# Patient Record
Sex: FEMALE | Race: OTHER | URBAN - METROPOLITAN AREA
[De-identification: names, ages, dates, MRNs, and addresses within clinical notes are randomized per-mention and may not be internally consistent; named-entity substitution may affect disease eponyms.]

---

## 2023-07-16 SDOH — HEALTH STABILITY: PHYSICAL HEALTH: ON AVERAGE, HOW MANY DAYS PER WEEK DO YOU ENGAGE IN MODERATE TO STRENUOUS EXERCISE (LIKE A BRISK WALK)?: 2 DAYS

## 2023-07-16 SDOH — HEALTH STABILITY: PHYSICAL HEALTH: ON AVERAGE, HOW MANY MINUTES DO YOU ENGAGE IN EXERCISE AT THIS LEVEL?: 20 MIN

## 2023-07-16 ASSESSMENT — SOCIAL DETERMINANTS OF HEALTH (SDOH)

## 2023-07-19 ENCOUNTER — OFFICE VISIT (OUTPATIENT)
Age: 23
End: 2023-07-19

## 2023-07-19 VITALS
WEIGHT: 103.2 LBS | OXYGEN SATURATION: 98 % | RESPIRATION RATE: 16 BRPM | HEART RATE: 72 BPM | HEIGHT: 62 IN | SYSTOLIC BLOOD PRESSURE: 117 MMHG | DIASTOLIC BLOOD PRESSURE: 78 MMHG | TEMPERATURE: 98.1 F | BODY MASS INDEX: 18.99 KG/M2

## 2023-07-19 DIAGNOSIS — N92.6 MISSED MENSES: Primary | ICD-10-CM

## 2023-07-19 LAB
HCG, PREGNANCY, URINE, POC: POSITIVE
VALID INTERNAL CONTROL, POC: NORMAL

## 2023-07-19 PROCEDURE — PBSHW AMB POC URINE PREGNANCY TEST, VISUAL COLOR COMPARISON: Performed by: FAMILY MEDICINE

## 2023-07-19 PROCEDURE — 99203 OFFICE O/P NEW LOW 30 MIN: CPT | Performed by: FAMILY MEDICINE

## 2023-07-19 PROCEDURE — 81025 URINE PREGNANCY TEST: CPT | Performed by: FAMILY MEDICINE

## 2023-07-19 ASSESSMENT — ENCOUNTER SYMPTOMS
SHORTNESS OF BREATH: 0
NAUSEA: 0

## 2023-07-19 ASSESSMENT — PATIENT HEALTH QUESTIONNAIRE - PHQ9
2. FEELING DOWN, DEPRESSED OR HOPELESS: 1
SUM OF ALL RESPONSES TO PHQ QUESTIONS 1-9: 1
1. LITTLE INTEREST OR PLEASURE IN DOING THINGS: 0
SUM OF ALL RESPONSES TO PHQ QUESTIONS 1-9: 1
SUM OF ALL RESPONSES TO PHQ QUESTIONS 1-9: 1
SUM OF ALL RESPONSES TO PHQ9 QUESTIONS 1 & 2: 1
SUM OF ALL RESPONSES TO PHQ QUESTIONS 1-9: 1

## 2023-07-24 ENCOUNTER — INITIAL PRENATAL (OUTPATIENT)
Age: 23
End: 2023-07-24

## 2023-07-24 ENCOUNTER — HOSPITAL ENCOUNTER (OUTPATIENT)
Facility: HOSPITAL | Age: 23
Setting detail: SPECIMEN
Discharge: HOME | End: 2023-07-27

## 2023-07-24 VITALS
RESPIRATION RATE: 16 BRPM | SYSTOLIC BLOOD PRESSURE: 99 MMHG | TEMPERATURE: 98 F | OXYGEN SATURATION: 99 % | WEIGHT: 102.8 LBS | DIASTOLIC BLOOD PRESSURE: 65 MMHG | HEART RATE: 72 BPM | HEIGHT: 62 IN | BODY MASS INDEX: 18.92 KG/M2

## 2023-07-24 DIAGNOSIS — Z34.90 ENCOUNTER FOR SUPERVISION OF NORMAL PREGNANCY, ANTEPARTUM, UNSPECIFIED GRAVIDITY: ICD-10-CM

## 2023-07-24 DIAGNOSIS — Z34.91 INITIAL OBSTETRIC VISIT IN FIRST TRIMESTER: ICD-10-CM

## 2023-07-24 DIAGNOSIS — Z34.91 INITIAL OBSTETRIC VISIT IN FIRST TRIMESTER: Primary | ICD-10-CM

## 2023-07-24 DIAGNOSIS — Z34.91 PRENATAL CARE IN FIRST TRIMESTER: ICD-10-CM

## 2023-07-24 LAB
ABO + RH BLD: NORMAL
APPEARANCE UR: ABNORMAL
BILIRUB UR QL: NEGATIVE
BLOOD BANK CMNT PATIENT-IMP: NORMAL
BLOOD GROUP ANTIBODIES SERPL: NORMAL
COLOR UR: ABNORMAL
ERYTHROCYTE [DISTWIDTH] IN BLOOD BY AUTOMATED COUNT: 13 % (ref 11.5–14.5)
GLUCOSE UR STRIP.AUTO-MCNC: NEGATIVE MG/DL
HBV SURFACE AG SER QL: <0.1 INDEX
HBV SURFACE AG SER QL: NEGATIVE
HCT VFR BLD AUTO: 38 % (ref 35–47)
HCV AB SERPL QL IA: NONREACTIVE
HGB BLD-MCNC: 12.3 G/DL (ref 11.5–16)
HGB UR QL STRIP: NEGATIVE
HIV 1+2 AB+HIV1 P24 AG SERPL QL IA: NONREACTIVE
HIV 1/2 RESULT COMMENT: NORMAL
KETONES UR QL STRIP.AUTO: NEGATIVE MG/DL
LEUKOCYTE ESTERASE UR QL STRIP.AUTO: NEGATIVE
MCH RBC QN AUTO: 29.1 PG (ref 26–34)
MCHC RBC AUTO-ENTMCNC: 32.4 G/DL (ref 30–36.5)
MCV RBC AUTO: 89.8 FL (ref 80–99)
NITRITE UR QL STRIP.AUTO: NEGATIVE
NRBC # BLD: 0 K/UL (ref 0–0.01)
NRBC BLD-RTO: 0 PER 100 WBC
PH UR STRIP: 7 (ref 5–8)
PLATELET # BLD AUTO: 281 K/UL (ref 150–400)
PMV BLD AUTO: 11.6 FL (ref 8.9–12.9)
PROT UR STRIP-MCNC: NEGATIVE MG/DL
RBC # BLD AUTO: 4.23 M/UL (ref 3.8–5.2)
RUBV IGG SERPL IA-ACNC: NORMAL IU/ML
SP GR UR REFRACTOMETRY: 1.02 (ref 1–1.03)
SPECIMEN EXP DATE BLD: NORMAL
UROBILINOGEN UR QL STRIP.AUTO: 0.2 EU/DL (ref 0.2–1)
WBC # BLD AUTO: 7.8 K/UL (ref 3.6–11)

## 2023-07-24 PROCEDURE — 88175 CYTOPATH C/V AUTO FLUID REDO: CPT

## 2023-07-24 PROCEDURE — 87491 CHLMYD TRACH DNA AMP PROBE: CPT

## 2023-07-24 PROCEDURE — 87661 TRICHOMONAS VAGINALIS AMPLIF: CPT

## 2023-07-24 PROCEDURE — 0501F PRENATAL FLOW SHEET: CPT

## 2023-07-24 PROCEDURE — 87591 N.GONORRHOEAE DNA AMP PROB: CPT

## 2023-07-24 RX ORDER — PNV NO.95/FERROUS FUM/FOLIC AC 28MG-0.8MG
1 TABLET ORAL DAILY
Qty: 90 TABLET | Refills: 3 | Status: SHIPPED | OUTPATIENT
Start: 2023-07-24 | End: 2023-07-24 | Stop reason: SDUPTHER

## 2023-07-24 RX ORDER — PNV NO.95/FERROUS FUM/FOLIC AC 28MG-0.8MG
1 TABLET ORAL DAILY
Qty: 90 TABLET | Refills: 3 | Status: SHIPPED | OUTPATIENT
Start: 2023-07-24 | End: 2024-02-29

## 2023-07-25 LAB
BACTERIA SPEC CULT: NORMAL
RPR SER QL: NONREACTIVE
SERVICE CMNT-IMP: NORMAL
VZV IGG SER IA-ACNC: 2236 INDEX

## 2023-07-27 LAB
C TRACH RRNA SPEC QL NAA+PROBE: NEGATIVE
HGB A MFR BLD: 97.4 % (ref 96.4–98.8)
HGB A2 MFR BLD COLUMN CHROM: 2.6 % (ref 1.8–3.2)
HGB F MFR BLD: 0 % (ref 0–2)
HGB FRACT BLD-IMP: NORMAL
HGB S MFR BLD: 0 %
N GONORRHOEA RRNA SPEC QL NAA+PROBE: NEGATIVE
SPECIMEN SOURCE: NORMAL
T VAGINALIS RRNA SPEC QL NAA+PROBE: NEGATIVE

## 2023-08-21 NOTE — PROGRESS NOTES
Return OB Visit       Subjective: [facility]  [id number]]   Jesica Freedman 26 y.o.  at [place]:  15w5d, FALGUNI: Estimated Date of Delivery: 24 by LMP and confirmed by 1st trimester US at 11w4d on . Patient reports feeling well. No new concerns at this time. Patient denies headache, visual disturbances, CP, SOB, RUQ pain, dysuria, and calf tenderness. OB History:  See Chart    LOF: no  Vaginal bleeding: no  Fetal movement (after 20 weeks): yes  Contractions: no  Taking prenatal vitamins: yes      Allergies- reviewed:   No Known Allergies  Medications- reviewed:   Current Outpatient Medications   Medication Sig    Prenatal Vit-Fe Fumarate-FA (PRENATAL VITAMIN) 27-0.8 MG TABS Take 1 tablet by mouth daily for 220 doses     No current facility-administered medications for this visit. Past Medical History- reviewed:  History reviewed. No pertinent past medical history. Past Surgical History- reviewed:   History reviewed. No pertinent surgical history.   Social History- reviewed:  Social History     Socioeconomic History    Marital status: Life Partner     Spouse name: Not on file    Number of children: Not on file    Years of education: Not on file    Highest education level: Not on file   Occupational History    Not on file   Tobacco Use    Smoking status: Never     Passive exposure: Never    Smokeless tobacco: Never   Vaping Use    Vaping Use: Never used   Substance and Sexual Activity    Alcohol use: Never    Drug use: Never    Sexual activity: Yes     Partners: Male   Other Topics Concern    Not on file   Social History Narrative    Not on file     Social Determinants of Health     Financial Resource Strain: Medium Risk    Difficulty of Paying Living Expenses: Somewhat hard   Food Insecurity: Food Insecurity Present    Worried About Running Out of Food in the Last Year: Sometimes true    Ran Out of Food in the Last Year: Sometimes true   Transportation Needs: Unmet Transportation Needs

## 2023-08-22 ENCOUNTER — ROUTINE PRENATAL (OUTPATIENT)
Age: 23
End: 2023-08-22

## 2023-08-22 VITALS
SYSTOLIC BLOOD PRESSURE: 106 MMHG | RESPIRATION RATE: 18 BRPM | OXYGEN SATURATION: 98 % | TEMPERATURE: 98.4 F | WEIGHT: 105.82 LBS | DIASTOLIC BLOOD PRESSURE: 69 MMHG | HEIGHT: 62 IN | HEART RATE: 72 BPM | BODY MASS INDEX: 19.47 KG/M2

## 2023-08-22 DIAGNOSIS — Z34.92 PRENATAL CARE IN SECOND TRIMESTER: Primary | ICD-10-CM

## 2023-08-22 PROCEDURE — 0502F SUBSEQUENT PRENATAL CARE: CPT

## 2023-08-22 SDOH — ECONOMIC STABILITY: FOOD INSECURITY: WITHIN THE PAST 12 MONTHS, THE FOOD YOU BOUGHT JUST DIDN'T LAST AND YOU DIDN'T HAVE MONEY TO GET MORE.: SOMETIMES TRUE

## 2023-08-22 SDOH — ECONOMIC STABILITY: HOUSING INSECURITY
IN THE LAST 12 MONTHS, WAS THERE A TIME WHEN YOU DID NOT HAVE A STEADY PLACE TO SLEEP OR SLEPT IN A SHELTER (INCLUDING NOW)?: NO

## 2023-08-22 SDOH — ECONOMIC STABILITY: FOOD INSECURITY: WITHIN THE PAST 12 MONTHS, YOU WORRIED THAT YOUR FOOD WOULD RUN OUT BEFORE YOU GOT MONEY TO BUY MORE.: SOMETIMES TRUE

## 2023-08-22 SDOH — ECONOMIC STABILITY: INCOME INSECURITY: HOW HARD IS IT FOR YOU TO PAY FOR THE VERY BASICS LIKE FOOD, HOUSING, MEDICAL CARE, AND HEATING?: SOMEWHAT HARD

## 2023-08-22 ASSESSMENT — PATIENT HEALTH QUESTIONNAIRE - PHQ9
SUM OF ALL RESPONSES TO PHQ QUESTIONS 1-9: 2
SUM OF ALL RESPONSES TO PHQ QUESTIONS 1-9: 2
2. FEELING DOWN, DEPRESSED OR HOPELESS: 1
SUM OF ALL RESPONSES TO PHQ QUESTIONS 1-9: 2
1. LITTLE INTEREST OR PLEASURE IN DOING THINGS: 1
SUM OF ALL RESPONSES TO PHQ9 QUESTIONS 1 & 2: 2
SUM OF ALL RESPONSES TO PHQ QUESTIONS 1-9: 2

## 2023-08-22 NOTE — PROGRESS NOTES
: [id number]    Chief Complaint   Patient presents with    Routine Prenatal Visit        Patient identified by name and . Patient is a  at 15w5d. Taking prenatal vitamins: Yes  Leakage of fluid: No  Vaginal bleeding: No  Feeling baby move if over 20 weeks: No, patient is under 20 weeks  Contractions: No  Pain: No    Vitals:    23 0844   Resp: 18   Weight: 105 lb 13.1 oz (48 kg)   Height: 5' 2\" (1.575 m)          There is no immunization history on file for this patient. 1. Have you been to the ER, urgent care clinic since your last visit? Hospitalized since your last visit? No    2. Have you seen or consulted any other health care providers outside of the [address] [place] [address] Avenue since your last visit? Include any pap smears or colon screening.  No

## 2023-08-22 NOTE — PROGRESS NOTES
I reviewed with the resident the medical history and the resident's findings on the physical examination. I discussed with the resident the patient's diagnosis and concur with the plan.     21yo  @ 15w5d by LMP c/w first tri [place]   IUP: RH pos, scheduling MFM appt, desires cfdna (drawn today, declined carrier screening)    Has applied for Medicaid

## 2023-08-25 LAB
AFP INTERP SERPL-IMP: NORMAL
AFP MOM SERPL: 1.11
AFP SERPL-MCNC: 43.2 NG/ML
AGE AT DELIVERY: 22.7 YR
COMMENT: NORMAL
GA METHOD: NORMAL
GA: 15 WEEKS
IDDM PATIENT QL: NO
Lab: NORMAL
MAT SCN FOR FETAL ABNORMALITIES SERPL: NORMAL
MULTIPLE PREGNANCY: NO
NEURAL TUBE DEFECT RISK FETUS: 8647

## 2023-08-31 LAB
Lab: NORMAL
NTRA 1P36 DELETION SYNDROME POPULATION-BASED RISK TEXT: NORMAL
NTRA 1P36 DELETION SYNDROME RESULT TEXT: NORMAL
NTRA 1P36 DELETION SYNDROME RISK SCORE TEXT: NORMAL
NTRA 22Q11.2 DELETION SYNDROME POPULATION-BASED RISK TEXT: NORMAL
NTRA 22Q11.2 DELETION SYNDROME RESULT TEXT: NORMAL
NTRA 22Q11.2 DELETION SYNDROME RISK SCORE TEXT: NORMAL
NTRA ANGELMAN SYNDROME POPULATION-BASED RISK TEXT: NORMAL
NTRA ANGELMAN SYNDROME RESULT TEXT: NORMAL
NTRA ANGELMAN SYNDROME RISK SCORE TEXT: NORMAL
NTRA CRI-DU-CHAT SYNDROME POPULATION-BASED RISK TEXT: NORMAL
NTRA CRI-DU-CHAT SYNDROME RESULT TEXT: NORMAL
NTRA CRI-DU-CHAT SYNDROME RISK SCORE TEXT: NORMAL
NTRA FETAL FRACTION: NORMAL
NTRA GENDER OF FETUS: NORMAL
NTRA MONOSOMY X AGE-BASED RISK TEXT: NORMAL
NTRA MONOSOMY X RESULT TEXT: NORMAL
NTRA MONOSOMY X RISK SCORE TEXT: NORMAL
NTRA PRADER-WILLI SYNDROME POPULATION-BASED RISK TEXT: NORMAL
NTRA PRADER-WILLI SYNDROME RESULT TEXT: NORMAL
NTRA PRADER-WILLI SYNDROME RISK SCORE TEXT: NORMAL
NTRA TRIPLOIDY RESULT TEXT: NORMAL
NTRA TRISOMY 13 AGE-BASED RISK TEXT: NORMAL
NTRA TRISOMY 13 RESULT TEXT: NORMAL
NTRA TRISOMY 13 RISK SCORE TEXT: NORMAL
NTRA TRISOMY 18 AGE-BASED RISK TEXT: NORMAL
NTRA TRISOMY 18 RESULT TEXT: NORMAL
NTRA TRISOMY 18 RISK SCORE TEXT: NORMAL
NTRA TRISOMY 21 AGE-BASED RISK TEXT: NORMAL
NTRA TRISOMY 21 RESULT TEXT: NORMAL
NTRA TRISOMY 21 RISK SCORE TEXT: NORMAL

## 2023-09-01 LAB
Lab: ABNORMAL
Lab: POSITIVE
NTRA ALPHA-THALASSEMIA: POSITIVE
NTRA BETA-HEMOGLOBINOPATHIES: NEGATIVE
NTRA CANAVAN DISEASE: NEGATIVE
NTRA CYSTIC FIBROSIS: NEGATIVE
NTRA DUCHENNE/BECKER MUSCULAR DYSTROPHY: NEGATIVE
NTRA FAMILIAL DYSAUTONOMIA: NEGATIVE
NTRA FRAGILE X SYNDROME: NEGATIVE
NTRA GALACTOSEMIA: NEGATIVE
NTRA GAUCHER DISEASE: NEGATIVE
NTRA MEDIUM CHAIN ACYL-COA DEHYDROGENASE DEFICIENCY: NEGATIVE
NTRA POLYCYSTIC KIDNEY DISEASE, AUTOSOMAL RECESSIVE: NEGATIVE
NTRA SMITH-LEMLI-OPITZ SYNDROME: NEGATIVE
NTRA SPINAL MUSCULAR ATROPHY: NEGATIVE
NTRA TAY-SACHS DISEASE: NEGATIVE

## 2023-09-06 ENCOUNTER — TELEPHONE (OUTPATIENT)
Age: 23
End: 2023-09-06

## 2023-09-06 NOTE — TELEPHONE ENCOUNTER
Informed patient over the phone of alpha thal carrier status. Patient to bring in FOB to next appointment for testing. Informed her that FOB testing is free. Answered all questions.

## 2023-09-18 ENCOUNTER — ROUTINE PRENATAL (OUTPATIENT)
Age: 23
End: 2023-09-18
Payer: COMMERCIAL

## 2023-09-18 VITALS
RESPIRATION RATE: 16 BRPM | OXYGEN SATURATION: 100 % | DIASTOLIC BLOOD PRESSURE: 73 MMHG | SYSTOLIC BLOOD PRESSURE: 108 MMHG | HEART RATE: 75 BPM | BODY MASS INDEX: 19.94 KG/M2 | WEIGHT: 109 LBS

## 2023-09-18 DIAGNOSIS — Z23 ENCOUNTER FOR IMMUNIZATION: ICD-10-CM

## 2023-09-18 DIAGNOSIS — Z3A.19 19 WEEKS GESTATION OF PREGNANCY: Primary | ICD-10-CM

## 2023-09-18 PROCEDURE — 90686 IIV4 VACC NO PRSV 0.5 ML IM: CPT

## 2023-09-18 PROCEDURE — PBSHW INFLUENZA, FLULAVAL, (AGE 6 MO+), IM, PF, 0.5ML

## 2023-09-18 PROCEDURE — 0502F SUBSEQUENT PRENATAL CARE: CPT

## 2023-09-18 ASSESSMENT — PATIENT HEALTH QUESTIONNAIRE - PHQ9
SUM OF ALL RESPONSES TO PHQ QUESTIONS 1-9: 1
SUM OF ALL RESPONSES TO PHQ QUESTIONS 1-9: 1
1. LITTLE INTEREST OR PLEASURE IN DOING THINGS: 1
2. FEELING DOWN, DEPRESSED OR HOPELESS: 0
SUM OF ALL RESPONSES TO PHQ9 QUESTIONS 1 & 2: 1
SUM OF ALL RESPONSES TO PHQ QUESTIONS 1-9: 1
SUM OF ALL RESPONSES TO PHQ QUESTIONS 1-9: 1

## 2023-09-18 NOTE — PROGRESS NOTES
Chief Complaint   Patient presents with    Routine Prenatal Visit        Patient identified by name and . Patient is a  at 19w4d. Taking prenatal vitamins: Yes  Leakage of fluid: No  Vaginal bleeding: No  Feeling baby move if over 20 weeks: Yes  Contractions: No  Pain: No    Vitals:    23 1312   BP: 108/73   Site: Left Upper Arm   Position: Sitting   Cuff Size: Medium Adult   Pulse: 75   Resp: 16   SpO2: 100%   Weight: 109 lb (49.4 kg)          There is no immunization history on file for this patient. 1. Have you been to the ER, urgent care clinic since your last visit? Hospitalized since your last visit? No    2. Have you seen or consulted any other health care providers outside of the [address] [place] [address] since your last visit? Include any pap smears or colon screening.  No

## 2023-09-18 NOTE — PROGRESS NOTES
Return OB Visit       Subjective:   Lauren Freedman 26 y.o.  at 19w4d, Estimated Date of Delivery: 24 by LMP and c/w  11w4d week US    Patient reports feeling well. No new concerns at this time. Patient denies headache, visual disturbances, CP, SOB, RUQ pain, dysuria, and calf tenderness. Pregnancy complicated by silent carrier for alpha thalassemia. OB History:  See Chart    LOF: no  Vaginal bleeding: no  Fetal movement (after 20 weeks): has started to feel movement  Contractions: no  Taking prenatal vitamins: yes     Allergies- reviewed:   No Known Allergies  Medications- reviewed:   Current Outpatient Medications   Medication Sig    Prenatal Vit-Fe Fumarate-FA (PRENATAL VITAMIN) 27-0.8 MG TABS Take 1 tablet by mouth daily for 220 doses     No current facility-administered medications for this visit. Past Medical History- reviewed:  No past medical history on file. Past Surgical History- reviewed:   No past surgical history on file.   Social History- reviewed:  Social History     Socioeconomic History    Marital status: Life Partner     Spouse name: Not on file    Number of children: Not on file    Years of education: Not on file    Highest education level: Not on file   Occupational History    Not on file   Tobacco Use    Smoking status: Never     Passive exposure: Never    Smokeless tobacco: Never   Vaping Use    Vaping Use: Never used   Substance and Sexual Activity    Alcohol use: Never    Drug use: Never    Sexual activity: Yes     Partners: Male   Other Topics Concern    Not on file   Social History Narrative    Not on file     Social Determinants of Health     Financial Resource Strain: Medium Risk (2023)    Overall Financial Resource Strain (CARDIA)     Difficulty of Paying Living Expenses: Somewhat hard   Food Insecurity: Food Insecurity Present (2023)    Hunger Vital Sign     Worried About Running Out of Food in the Last Year: Sometimes true     Ran Out of Food in the Last

## 2023-09-30 ENCOUNTER — CLINICAL DOCUMENTATION (OUTPATIENT)
Age: 23
End: 2023-09-30

## 2023-10-02 ENCOUNTER — ROUTINE PRENATAL (OUTPATIENT)
Age: 23
End: 2023-10-02
Payer: COMMERCIAL

## 2023-10-02 VITALS — SYSTOLIC BLOOD PRESSURE: 103 MMHG | DIASTOLIC BLOOD PRESSURE: 68 MMHG | HEART RATE: 85 BPM

## 2023-10-02 DIAGNOSIS — Z36.3 SCREENING, ANTENATAL, FOR MALFORMATION BY ULTRASOUND: ICD-10-CM

## 2023-10-02 DIAGNOSIS — Z3A.21 21 WEEKS GESTATION OF PREGNANCY: Primary | ICD-10-CM

## 2023-10-02 PROCEDURE — 76811 OB US DETAILED SNGL FETUS: CPT | Performed by: OBSTETRICS & GYNECOLOGY

## 2023-10-02 PROCEDURE — 99203 OFFICE O/P NEW LOW 30 MIN: CPT | Performed by: OBSTETRICS & GYNECOLOGY

## 2023-10-03 NOTE — PROCEDURES
PATIENT: FABRICIO HUSSEIN   -  : 2000   -  DOS:10/02/2023   -  INTERPRETING PROVIDER:Herway, Colon Splinter,   Indication  ========    Anatomy    Method  ======    Transabdominal ultrasound examination. View: Good view    Dating  ======    LMP on: 2023  GA by LMP 21 w + 4 d  FALGUNI by LMP: 2024  Previous Ultrasound on: 2023  Type of prior assessment: GA  GA at prior assessment date 12 w + 2 d  GA by previous U/S 22 w + 2 d  FALGUNI by previous Ultrasound: 2/3/2024  Ultrasound examination on: 10/2/2023  GA by U/S based upon: [facility], BPD, Femur, HC  GA by U/S 22 w + 6 d  FALGUNI by U/S: 2024  Assigned: based on the LMP, selected on 10/2/2023  Assigned GA 21 w + 4 d  Assigned FALGUNI: 2024    Fetal Growth Overview  =================    Exam date        GA              BPD (mm)          HC (mm)              AC (mm)              FL (mm)             HL (mm)             EFW (g)  10/2/2023          21w 4d        56.1    94%        206.7     85%        178.5    79%        39.3     76%        33.8    44%        528     92%    Fetal Biometry  ============    Standard  BPD 56.1 mm 23w 1d 94% Hadlock  OFD 72.6 mm 24w 1d 99% Gianni  .7 mm 22w 5d 85% Hadlock  Cerebellum tr 23.4 mm 21w 4d 71% Hill  Nuchal fold 4.8 mm  .5 mm 22w 5d 79% Hadlock  Femur 39.3 mm 22w 5d 76% Hadlock  Humerus 33.8 mm 21w 3d 44% Gianni   g 22w 4d 92% Hadlock  EFW (lb) 1 lb  EFW (oz) 3 oz  EFW by: Hadlock (BPD-HC-AC-FL)  Extended   6.1 mm  CM 6.4 mm  79% Nicolaides  Nasal bone 6.8 mm  Head / Face / Neck  Nasal bone: present  Other Structures   bpm    General Evaluation  ==============    Cardiac activity present.  bpm. Fetal movements: visualized. Presentation: Cephalic  Placenta: Placental site: posterior, no evidence of low lying  Umbilical cord: Cord vessels: 3 vessel cord.  Insertion site: normal insertion  Amniotic fluid: Amount of AF: normal amount    Fetal Anatomy  ===========    Cranium: normal  Lateral

## 2023-10-16 ENCOUNTER — ROUTINE PRENATAL (OUTPATIENT)
Age: 23
End: 2023-10-16

## 2023-10-16 VITALS
WEIGHT: 115 LBS | OXYGEN SATURATION: 98 % | SYSTOLIC BLOOD PRESSURE: 114 MMHG | TEMPERATURE: 98.2 F | DIASTOLIC BLOOD PRESSURE: 72 MMHG | HEART RATE: 74 BPM | BODY MASS INDEX: 21.03 KG/M2

## 2023-10-16 DIAGNOSIS — Z34.92 PRENATAL CARE IN SECOND TRIMESTER: Primary | ICD-10-CM

## 2023-10-16 NOTE — PROGRESS NOTES
I reviewed with the resident the medical history and the resident's findings on the physical examination. I discussed with the resident the patient's diagnosis and concur with the plan.     19yo  @ 23w4d by LMP c/w first tri sono   IUP: RH pos, normal anatomy  Abnormal carrier screening: alpha thal silent carrier, FOB tested neg

## 2023-10-16 NOTE — PROGRESS NOTES
[facility] [facility], [address] [facility]   Office [phone], Fax [phone]    Return OB Visit       Subjective: [facility]  [id number]]   Loius Freedman 26 y.o.  at [place]:  23w4d, FALGUNI: Estimated Date of Delivery: 24 by LMP and confirmed by 1st trimester US at 11w4d on . Patient reports feeling well. No new concerns at this time. Patient denies headache, visual disturbances, CP, SOB, RUQ pain, dysuria, and calf tenderness. OB History:  See Chart    LOF: no  Vaginal bleeding: no  Fetal movement (after 20 weeks): yes  Contractions: no  Taking prenatal vitamins: yes      Allergies- reviewed:   No Known Allergies  Medications- reviewed:   Current Outpatient Medications   Medication Sig    Prenatal Vit-Fe Fumarate-FA (PRENATAL VITAMIN) 27-0.8 MG TABS Take 1 tablet by mouth daily for 220 doses     No current facility-administered medications for this visit. Past Medical History- reviewed:  No past medical history on file. Past Surgical History- reviewed:   No past surgical history on file.   Social History- reviewed:  Social History     Socioeconomic History    Marital status: Life Partner     Spouse name: Not on file    Number of children: Not on file    Years of education: Not on file    Highest education level: Not on file   Occupational History    Not on file   Tobacco Use    Smoking status: Never     Passive exposure: Never    Smokeless tobacco: Never   Vaping Use    Vaping Use: Never used   Substance and Sexual Activity    Alcohol use: Never    Drug use: Never    Sexual activity: Yes     Partners: Male   Other Topics Concern    Not on file   Social History Narrative    Not on file     Social Determinants of Health     Financial Resource Strain: Medium Risk (2023)    Overall Financial Resource Strain (CARDIA)     Difficulty of Paying Living Expenses: Somewhat hard   Food Insecurity: Food Insecurity Present (2023)    Hunger Vital Sign     Worried About Running Out

## 2023-11-14 ENCOUNTER — ROUTINE PRENATAL (OUTPATIENT)
Age: 23
End: 2023-11-14
Payer: COMMERCIAL

## 2023-11-14 VITALS
BODY MASS INDEX: 22.26 KG/M2 | WEIGHT: 121 LBS | DIASTOLIC BLOOD PRESSURE: 63 MMHG | HEIGHT: 62 IN | TEMPERATURE: 97.5 F | OXYGEN SATURATION: 99 % | HEART RATE: 71 BPM | RESPIRATION RATE: 16 BRPM | SYSTOLIC BLOOD PRESSURE: 100 MMHG

## 2023-11-14 DIAGNOSIS — O99.013 ANEMIA DURING PREGNANCY IN THIRD TRIMESTER: ICD-10-CM

## 2023-11-14 DIAGNOSIS — Z23 NEED FOR TDAP VACCINATION: ICD-10-CM

## 2023-11-14 DIAGNOSIS — Z91.89 HAS MULTIPLE SEXUAL PARTNERS: ICD-10-CM

## 2023-11-14 DIAGNOSIS — Z34.93 PRENATAL CARE IN THIRD TRIMESTER: Primary | ICD-10-CM

## 2023-11-14 PROCEDURE — 90715 TDAP VACCINE 7 YRS/> IM: CPT

## 2023-11-14 PROCEDURE — PBSHW TDAP, BOOSTRIX, (AGE 10 YRS+), IM

## 2023-11-14 PROCEDURE — 0502F SUBSEQUENT PRENATAL CARE: CPT

## 2023-11-14 PROCEDURE — PBSHW PBB SHADOW CHARGE

## 2023-11-14 ASSESSMENT — PATIENT HEALTH QUESTIONNAIRE - PHQ9
1. LITTLE INTEREST OR PLEASURE IN DOING THINGS: 0
2. FEELING DOWN, DEPRESSED OR HOPELESS: 0
SUM OF ALL RESPONSES TO PHQ9 QUESTIONS 1 & 2: 0
SUM OF ALL RESPONSES TO PHQ QUESTIONS 1-9: 0

## 2023-11-15 DIAGNOSIS — O99.013 ANEMIA DURING PREGNANCY IN THIRD TRIMESTER: ICD-10-CM

## 2023-11-15 LAB
ERYTHROCYTE [DISTWIDTH] IN BLOOD BY AUTOMATED COUNT: 13.8 % (ref 11.5–14.5)
GLUCOSE 1H P 100 G GLC PO SERPL-MCNC: 98 MG/DL (ref 65–140)
HBV SURFACE AG SER QL: 0.21 INDEX
HBV SURFACE AG SER QL: NEGATIVE
HCT VFR BLD AUTO: 33.4 % (ref 35–47)
HGB BLD-MCNC: 10.7 G/DL (ref 11.5–16)
HIV 1+2 AB+HIV1 P24 AG SERPL QL IA: NONREACTIVE
HIV 1/2 RESULT COMMENT: NORMAL
MCH RBC QN AUTO: 30.7 PG (ref 26–34)
MCHC RBC AUTO-ENTMCNC: 32 G/DL (ref 30–36.5)
MCV RBC AUTO: 95.7 FL (ref 80–99)
NRBC # BLD: 0 K/UL (ref 0–0.01)
NRBC BLD-RTO: 0 PER 100 WBC
PLATELET # BLD AUTO: 296 K/UL (ref 150–400)
PMV BLD AUTO: 11.1 FL (ref 8.9–12.9)
RBC # BLD AUTO: 3.49 M/UL (ref 3.8–5.2)
RPR SER QL: NONREACTIVE
WBC # BLD AUTO: 9.8 K/UL (ref 3.6–11)

## 2023-11-15 RX ORDER — FERROUS SULFATE 325(65) MG
325 TABLET ORAL
Qty: 90 TABLET | Refills: 0 | Status: SHIPPED | OUTPATIENT
Start: 2023-11-15

## 2023-11-17 LAB
C TRACH RRNA SPEC QL NAA+PROBE: NEGATIVE
N GONORRHOEA RRNA SPEC QL NAA+PROBE: NEGATIVE
SPECIMEN SOURCE: NORMAL
T VAGINALIS RRNA SPEC QL NAA+PROBE: NEGATIVE

## 2023-11-17 RX ORDER — FERROUS SULFATE 325(65) MG
325 TABLET ORAL
Qty: 90 TABLET | Refills: 0 | OUTPATIENT
Start: 2023-11-17

## 2023-12-07 ENCOUNTER — ROUTINE PRENATAL (OUTPATIENT)
Age: 23
End: 2023-12-07

## 2023-12-07 VITALS
OXYGEN SATURATION: 97 % | WEIGHT: 125.4 LBS | TEMPERATURE: 98.3 F | SYSTOLIC BLOOD PRESSURE: 110 MMHG | HEART RATE: 72 BPM | RESPIRATION RATE: 18 BRPM | DIASTOLIC BLOOD PRESSURE: 76 MMHG | BODY MASS INDEX: 23.08 KG/M2 | HEIGHT: 62 IN

## 2023-12-07 DIAGNOSIS — Z34.93 PRENATAL CARE IN THIRD TRIMESTER: Primary | ICD-10-CM

## 2023-12-07 PROCEDURE — 0502F SUBSEQUENT PRENATAL CARE: CPT

## 2023-12-07 ASSESSMENT — PATIENT HEALTH QUESTIONNAIRE - PHQ9
SUM OF ALL RESPONSES TO PHQ QUESTIONS 1-9: 0
2. FEELING DOWN, DEPRESSED OR HOPELESS: 0
1. LITTLE INTEREST OR PLEASURE IN DOING THINGS: 0
SUM OF ALL RESPONSES TO PHQ9 QUESTIONS 1 & 2: 0
SUM OF ALL RESPONSES TO PHQ QUESTIONS 1-9: 0

## 2023-12-29 ENCOUNTER — ROUTINE PRENATAL (OUTPATIENT)
Age: 23
End: 2023-12-29

## 2023-12-29 VITALS
BODY MASS INDEX: 23.92 KG/M2 | WEIGHT: 130 LBS | TEMPERATURE: 98.5 F | DIASTOLIC BLOOD PRESSURE: 74 MMHG | RESPIRATION RATE: 14 BRPM | HEART RATE: 77 BPM | HEIGHT: 62 IN | OXYGEN SATURATION: 98 % | SYSTOLIC BLOOD PRESSURE: 114 MMHG

## 2023-12-29 DIAGNOSIS — Z3A.34 34 WEEKS GESTATION OF PREGNANCY: Primary | ICD-10-CM

## 2024-01-11 ENCOUNTER — ROUTINE PRENATAL (OUTPATIENT)
Age: 24
End: 2024-01-11

## 2024-01-11 VITALS
WEIGHT: 132 LBS | BODY MASS INDEX: 24.29 KG/M2 | SYSTOLIC BLOOD PRESSURE: 116 MMHG | RESPIRATION RATE: 18 BRPM | HEIGHT: 62 IN | TEMPERATURE: 98 F | HEART RATE: 86 BPM | OXYGEN SATURATION: 98 % | DIASTOLIC BLOOD PRESSURE: 70 MMHG

## 2024-01-11 DIAGNOSIS — Z3A.36 36 WEEKS GESTATION OF PREGNANCY: Primary | ICD-10-CM

## 2024-01-11 PROCEDURE — 0502F SUBSEQUENT PRENATAL CARE: CPT

## 2024-01-11 ASSESSMENT — PATIENT HEALTH QUESTIONNAIRE - PHQ9
SUM OF ALL RESPONSES TO PHQ9 QUESTIONS 1 & 2: 0
2. FEELING DOWN, DEPRESSED OR HOPELESS: 0
SUM OF ALL RESPONSES TO PHQ QUESTIONS 1-9: 0
1. LITTLE INTEREST OR PLEASURE IN DOING THINGS: 0
SUM OF ALL RESPONSES TO PHQ QUESTIONS 1-9: 0

## 2024-01-11 NOTE — PROGRESS NOTES
I reviewed with the resident the medical history and the resident's findings on the physical examination.  I discussed with the resident the patient's diagnosis and concur with the plan.    Alida Cintron is a 23 y.o. female  at 36w0d. presents for routine PNC. 2024, by Last Menstrual Period  Concerns addressed: Routine PNC, GBS collected.   Pregnancy complicated by:  Silent carrier for alpha thalassemia w/ mild anemia  Denies VB, LOF, rare Contractions. + Fetal movement.  Weight gain reviewed.  RTC in 1 week    /70   Pulse 86   Temp 98 °F (36.7 °C) (Temporal)   Resp 18   Ht 1.575 m (5' 2\")   Wt 59.9 kg (132 lb)   LMP 2023   SpO2 98%   BMI 24.14 kg/m²   FHT: 1440  FH: 36.5cm          
Pt roomed by first and last name and .    Session # [id number]   # [id number]    Chief Complaint   Patient presents with    Routine Prenatal Visit     .  36w 0w today.  No bleeding or LOF.  +FM.        Vitals:    24 0824   BP: 116/70   Pulse: 86   Resp: 18   Temp: 98 °F (36.7 °C)   TempSrc: Temporal   SpO2: 98%   Weight: 59.9 kg (132 lb)   Height: 1.575 m (5' 2\")        1. Have you been to the ER, urgent care clinic since your last visit?  Hospitalized since your last visit?No    2. Have you seen or consulted any other health care providers outside of the [facility] since your last visit?  Include any pap smears or colon screening. No     
after-visit summary and questions were answered concerning future plans.  I have discussed medication side effects and warnings with the patient as well. Informed pt to return to the office or go to the ER if she experiences vaginal bleeding, vaginal discharge, leaking of fluid, pelvic cramping.    Pt discussed with Dr. White (attending physician)    Jose Ag MD  Family Medicine Resident

## 2024-01-14 LAB
BACTERIA SPEC CULT: NORMAL
SERVICE CMNT-IMP: NORMAL

## 2024-01-15 ENCOUNTER — ROUTINE PRENATAL (OUTPATIENT)
Age: 24
End: 2024-01-15
Payer: COMMERCIAL

## 2024-01-15 VITALS — SYSTOLIC BLOOD PRESSURE: 114 MMHG | HEART RATE: 96 BPM | DIASTOLIC BLOOD PRESSURE: 72 MMHG

## 2024-01-15 DIAGNOSIS — Z3A.36 36 WEEKS GESTATION OF PREGNANCY: Primary | ICD-10-CM

## 2024-01-15 PROCEDURE — 76816 OB US FOLLOW-UP PER FETUS: CPT | Performed by: OBSTETRICS & GYNECOLOGY

## 2024-01-15 NOTE — PROGRESS NOTES
I reviewed with the resident the medical history and the resident's findings on the physical examination.  I discussed with the resident the patient's diagnosis and concur with the plan, with the following additions:     23 y.o.   at 36w6d by L/12 here for HERI    BP Readings from Last 3 Encounters:   01/15/24 114/72   24 116/70   23 114/74       Wt Readings from Last 10 Encounters:   24 60.3 kg (133 lb)   24 59.9 kg (132 lb)   23 59 kg (130 lb)   23 56.9 kg (125 lb 6.4 oz)   23 54.9 kg (121 lb)   10/16/23 52.2 kg (115 lb)   23 49.4 kg (109 lb)   23 48 kg (105 lb 13.1 oz)   23 46.6 kg (102 lb 12.8 oz)   23 46.8 kg (103 lb 3.2 oz)     A/P:    22yo  at 36w6d by LMP/12ws     IUP: RH pos, normal anatomy, f/up growth 61/88%ile on 1/15, cephalic  GTT wnl  STI screening neg  CBC 10.7  s/p tdap, flu  GBS neg  Abnormal carrier screening: alpha thal silent carrier, FOB tested neg  Anemia: mild, on PO iron     Third trimester counseling:  Epidural? yes  Breastfeeding? yes  Circumcision? no  Contraception plan? Nexplanon  Discussed directions to the hospital, LAD, and LAD precautions, including s/s of labor and SROM.      Today: discuss delivery plans.  Next visit: offer to check cervix, sweep membranes if able      Follow up in 1 weeks.  Scheduled for 2024      30 minutes were spent on the day of this encounter both with the patient and in related activities including chart review, care coordination and counseling.        Freddy Engel MD, MPH  [facility]

## 2024-01-15 NOTE — PROCEDURES
PATIENT: FABRICIO HUSSEIN   -  : 2000   -  DOS:01/15/2024   -  INTERPRETING PROVIDER:Sven Rojas,   Indication  ========    Size/date discrepancy    Method  ======    Transabdominal ultrasound examination. View: Sufficient    Pregnancy  =========    Beckman pregnancy. Number of fetuses: 1    Dating  ======    LMP on: 2023  GA by LMP 36 w + 4 d  FALGUNI by LMP: 2024  Previous Ultrasound on: 2023  Type of prior assessment: GA  GA at prior assessment date 12 w + 2 d  GA by previous U/S 37 w + 2 d  FALGUNI by previous Ultrasound: 2/3/2024  Ultrasound examination on: 1/15/2024  GA by U/S based upon: AC, BPD, Femur, HC  GA by U/S 36 w + 4 d  FALGUNI by U/S: 2024  Assigned: based on the LMP, selected on 10/2/2023  Assigned GA 36 w + 4 d  Assigned FALGUNI: 2024    Fetal Biometry  ============    Standard  BPD 90.4 mm 36w 4d 64% Hadlock  .0 mm 40w 0d 87% Gianni  .0 mm 37w 2d 38% Hadlock  .2 mm 37w 5d 88% Hadlock  Femur 67.6 mm 34w 5d 9% Hadlock  EFW 3,046 g 37w 1d 61% Hadlock  EFW (lb) 6 lb  EFW (oz) 11 oz  EFW by: Hadlock (BPD-HC-AC-FL)  Other Structures   bpm    General Evaluation  ==============    Cardiac activity present.  bpm. Fetal movements: visualized. Presentation: Cephalic  Placenta: Placental site: posterior, no evidence of low lying  Umbilical cord: Cord vessels: 3 vessel cord. Insertion site: normal insertion  Amniotic fluid: Amount of AF: normal. MVP 5.0 cm. ALLAN 14.8 cm. Q1 3.1 cm, Q2 2.8 cm, Q3 5.0 cm, Q4 4.0 cm    Fetal Anatomy  ===========    Cranium: normal  Lateral ventricles: normal  Midline falx: normal  4-chamber view: visualized  LVOT view: visualized  Stomach: normal  Kidneys: normal  Bladder: normal  Genitals: normal  Wants to know fetal sex: yes    Findings  =======    Viable Beckman pregnancy at 36w 4d by clinical dates.  EFW is 3046 g at 61%, abdominal circumference at 88%.  Anatomy visualized as stated above.  Amniotic fluid is

## 2024-01-17 ENCOUNTER — ROUTINE PRENATAL (OUTPATIENT)
Age: 24
End: 2024-01-17

## 2024-01-17 VITALS
SYSTOLIC BLOOD PRESSURE: 115 MMHG | TEMPERATURE: 98.2 F | BODY MASS INDEX: 24.48 KG/M2 | WEIGHT: 133 LBS | RESPIRATION RATE: 16 BRPM | HEIGHT: 62 IN | OXYGEN SATURATION: 98 % | DIASTOLIC BLOOD PRESSURE: 73 MMHG | HEART RATE: 84 BPM

## 2024-01-17 DIAGNOSIS — Z3A.36 36 WEEKS GESTATION OF PREGNANCY: Primary | ICD-10-CM

## 2024-01-17 PROBLEM — Z34.90 PREGNANCY: Status: ACTIVE | Noted: 2024-01-17

## 2024-01-17 PROCEDURE — 0502F SUBSEQUENT PRENATAL CARE: CPT

## 2024-01-17 NOTE — PROGRESS NOTES
[facility]     Return OB Visit     Subjective:   Alida Montejo Adquleida 23 y.o.   FALGUNI: 2024, by Last Menstrual Period  GA:  36w6d.      Objective:   Physical Exam:  Blood pressure 115/73, pulse 84, temperature 98.2 °F (36.8 °C), resp. rate 16, height 1.575 m (5' 2\"), weight 60.3 kg (133 lb), last menstrual period 2023, SpO2 98 %.  Patient without distress       FHT:  150 FH: 37 cm    Assessment/Plan:   SIUP at  36w6d wks by LMP confirmed by 1st trimester US.      ICD-10-CM    1. 36 weeks gestation of pregnancy  Z3A.36           SIUP at 36w6d:  - PNL: O+, Ab neg, third trim Hb 10.7, Hgb fractionation wnl, HepBSAg neg, Hep C Ab NR, HIV/RPR NR, Rubella/VZV immune, U/A without proteinuria, Ucx no growth. Pap NILM. Gc/Ch/Trich negative. Third tri STI screen negative. GBS negative.   - Genetic screening: Silent carrier Alpha thalassemia. FOB negative. AFP screen neg. Panorama low risk, male.    - Vaccines: S/p flu, Tdap  - Ultrasound: 36w4d (1/15/23) EFW 3046 g 61%, AC 88%, ALLAN normal, placenta posterior, cephalic, anatomy normal  - Follow up 23  - Discussed kick counts, contractions, and reasons to go to hospital. Confirmed patient knows where L&D is.     Alpha thalassemia, silent carrier: FOB tested and was negative. MRN [id number].     Mild Anemia: Hgb 10.7 in third trimester.   - Cont oral iron     ---------------------------------------  Continuity Provider: Meliza Ag Young  Pain mgmt. in labor: interested in epidural  Feeding: breast and formula  Circ: no  Menses suppression plan: Nexplanon - form signed     Patient's care discussed with Dr. Engel.     Margie Henley DO

## 2024-01-17 NOTE — PROGRESS NOTES
Room 6    AMA  Services Utilized for Rooming Protocol  Slovenian Interpretor Number: Harjinder # [id number]    Session Code: 08575     Identified patient with two patient identifiers (Name and ).     Chief Complaint   Patient presents with    Routine Prenatal Visit     36 Weeks 6 Days       Patient in the office to for routine prenatal appointment today. Patient denies vaginal discharge, abnormal vaginal spotting/bleeding or fluid leakage. Patient also denies abdominal pain/cramping/discomfort or contractions. Patient confirms active fetal movement. States she is compliant with taking prenatal vitamins as prescribed. No further concerns voiced at this time     If Patient is 30 Weeks or Greater; Indications of induction or does patient meet induction criteria? Yes or No  .........If yes please provide patient with pre induction questionnaire and induction information.    Vitals:    24 0836   BP: 115/73   Site: Left Upper Arm   Position: Sitting   Cuff Size: Medium Adult   Pulse: 84   Resp: 16   Temp: 98.2 °F (36.8 °C)   SpO2: 98%   Weight: 60.3 kg (133 lb)   Height: 1.575 m (5' 2\")          1. \"Have you been to the ER, urgent care clinic since your last visit?  Hospitalized since your last visit?\" No      2. \"Have you seen or consulted any other health care providers outside of the [facility] since your last visit?\" No       Patient is accompanied by Spuse I have received verbal consent from Alida Cintron to discuss any/all medical information while they are present in the room.

## 2024-01-19 ENCOUNTER — TELEPHONE (OUTPATIENT)
Age: 24
End: 2024-01-19

## 2024-01-19 NOTE — TELEPHONE ENCOUNTER
I called the patient today to let her know that I spoke to [facility] pharmacy and they said they tried to contact her multiple times in regards to her Nexplanon implant. I told her that they need her to call them back at [phone]. They need her to confirm her demographic information and she needs to give consent for her Nexplanon to be sent to us. Patient is aware and understood she needs to call and she wrote the number down.

## 2024-01-21 ENCOUNTER — HOSPITAL ENCOUNTER (OUTPATIENT)
Facility: HOSPITAL | Age: 24
Discharge: HOME | DRG: 560 | End: 2024-01-21
Attending: OBSTETRICS & GYNECOLOGY | Admitting: OBSTETRICS & GYNECOLOGY
Payer: COMMERCIAL

## 2024-01-21 VITALS
OXYGEN SATURATION: 99 % | TEMPERATURE: 97.6 F | HEART RATE: 84 BPM | RESPIRATION RATE: 18 BRPM | DIASTOLIC BLOOD PRESSURE: 70 MMHG | SYSTOLIC BLOOD PRESSURE: 113 MMHG

## 2024-01-21 LAB
APPEARANCE UR: CLEAR
BACTERIA URNS QL MICRO: NEGATIVE /HPF
BILIRUB UR QL: NEGATIVE
COLOR UR: NORMAL
EPITH CASTS URNS QL MICRO: NORMAL /LPF
GLUCOSE UR STRIP.AUTO-MCNC: NEGATIVE MG/DL
HGB UR QL STRIP: NEGATIVE
HYALINE CASTS URNS QL MICRO: NORMAL /LPF (ref 0–2)
KETONES UR QL STRIP.AUTO: NEGATIVE MG/DL
LEUKOCYTE ESTERASE UR QL STRIP.AUTO: NEGATIVE
NITRITE UR QL STRIP.AUTO: NEGATIVE
PH UR STRIP: 7 (ref 5–8)
PROT UR STRIP-MCNC: NEGATIVE MG/DL
RBC #/AREA URNS HPF: NORMAL /HPF (ref 0–5)
SP GR UR REFRACTOMETRY: <1.005 (ref 1–1.03)
URINE CULTURE IF INDICATED: NORMAL
UROBILINOGEN UR QL STRIP.AUTO: 0.2 EU/DL (ref 0.2–1)
WBC URNS QL MICRO: NORMAL /HPF (ref 0–4)

## 2024-01-21 PROCEDURE — 99213 OFFICE O/P EST LOW 20 MIN: CPT

## 2024-01-21 PROCEDURE — 59025 FETAL NON-STRESS TEST: CPT

## 2024-01-21 PROCEDURE — 81001 URINALYSIS AUTO W/SCOPE: CPT

## 2024-01-21 NOTE — PROGRESS NOTES
0245  Patient arrives to L&D reporting contractions every 5 minutes apart that started at 1900.  0313  SVE 1.5/50/-2  0319  Resident MD notified  0335  Resident MD at bedside assessing the patient and the strip  Patient may be off the monitor to walk or rock on birthing ball.  0350  LAURA Rosenbaum CNM notified.  Per CNM re-check cervix at 0600.  0554  SVE no change  Resident MD and LAURA DIAZM notified  0630  CNM and Resident MD at bedside  UA sent.  Patient teaching by CNM, all questions answered.

## 2024-01-21 NOTE — PROGRESS NOTES
0719 - In to see patient,  services used.  number [id number]. Discussed plan of care. Answered questions and asked premission to do vital signs and listed to baby. Dr. Felix in to go over urinalysis results. Patient to be discharged, discharge instructions reviewed and copy given to patients significant other. Given an additional opportunity to ask questions and discharged. Patient leaving unit with family at 0734.

## 2024-01-21 NOTE — H&P
[id number] [id number]   Office [phone], Fax [phone]      History & Physical    Name: Alida Cintron MRN: [id number]  SSN: [id number]    YOB: 2000  Age: 23 y.o.  Sex: female      Subjective:     Reason for Triage:  Pregnancy and Contractions    History of Present Illness: Ms. Gustabo Cintron is a 23 y.o.   female with an estimated gestational age of 37w3d with Estimated Date of Delivery: 24. Patient complains of moderate contractions for 9 hours. Pregnancy has been uncomplicated. Patient denies chest pain, headache , right upper quadrant pain  , shortness of breath, vaginal bleeding , vaginal leaking of fluid , visual disturbances, and burning with urination.    OB History    Para Term  AB Living   1             SAB IAB Ectopic Molar Multiple Live Births                    # Outcome Date GA Lbr Carlos/2nd Weight Sex Delivery Anes PTL Lv   1 Current              Past Medical History:   Diagnosis Date    Anemia      No past surgical history on file.  Social History     Occupational History    Not on file   Tobacco Use    Smoking status: Never     Passive exposure: Never    Smokeless tobacco: Never   Vaping Use    Vaping Use: Never used   Substance and Sexual Activity    Alcohol use: Never    Drug use: Never    Sexual activity: Yes     Partners: Male      No family history on file.    No Known Allergies  Prior to Admission medications    Medication Sig Start Date End Date Taking? Authorizing Provider   ferrous sulfate (IRON 325) 325 (65 Fe) MG tablet Take 1 tablet by mouth daily (with breakfast) 11/15/23   Jose Ag MD   Prenatal Vit-Fe Fumarate-FA (PRENATAL VITAMIN) 27-0.8 MG TABS Take 1 tablet by mouth daily for 220 doses 23  Jose Ag MD        Review of Systems:  Pertinent items are noted in the History of Present Illness.     Objective:     Vitals:    Vitals:    24 0305   BP: 121/76   Pulse: 67   Resp: 16   Temp:

## 2024-01-24 ENCOUNTER — ANESTHESIA (OUTPATIENT)
Facility: HOSPITAL | Age: 24
DRG: 560 | End: 2024-01-24
Payer: COMMERCIAL

## 2024-01-24 ENCOUNTER — ANESTHESIA EVENT (OUTPATIENT)
Facility: HOSPITAL | Age: 24
DRG: 560 | End: 2024-01-24
Payer: COMMERCIAL

## 2024-01-24 ENCOUNTER — HOSPITAL ENCOUNTER (INPATIENT)
Facility: HOSPITAL | Age: 24
LOS: 2 days | Discharge: HOME | DRG: 560 | End: 2024-01-26
Attending: FAMILY MEDICINE | Admitting: FAMILY MEDICINE
Payer: COMMERCIAL

## 2024-01-24 PROBLEM — Z3A.37 37 WEEKS GESTATION OF PREGNANCY: Status: ACTIVE | Noted: 2024-01-24

## 2024-01-24 LAB
ABO + RH BLD: NORMAL
BASOPHILS # BLD: 0 K/UL (ref 0–0.1)
BASOPHILS NFR BLD: 0 % (ref 0–1)
BLOOD GROUP ANTIBODIES SERPL: NORMAL
COMMENT:: NORMAL
DIFFERENTIAL METHOD BLD: ABNORMAL
EOSINOPHIL # BLD: 0 K/UL (ref 0–0.4)
EOSINOPHIL NFR BLD: 0 % (ref 0–7)
ERYTHROCYTE [DISTWIDTH] IN BLOOD BY AUTOMATED COUNT: 12.2 % (ref 11.5–14.5)
HCT VFR BLD AUTO: 34.3 % (ref 35–47)
HGB BLD-MCNC: 11.7 G/DL (ref 11.5–16)
IMM GRANULOCYTES # BLD AUTO: 0.1 K/UL (ref 0–0.04)
IMM GRANULOCYTES NFR BLD AUTO: 1 % (ref 0–0.5)
LYMPHOCYTES # BLD: 1.2 K/UL (ref 0.8–3.5)
LYMPHOCYTES NFR BLD: 11 % (ref 12–49)
MCH RBC QN AUTO: 31.6 PG (ref 26–34)
MCHC RBC AUTO-ENTMCNC: 34.1 G/DL (ref 30–36.5)
MCV RBC AUTO: 92.7 FL (ref 80–99)
MONOCYTES # BLD: 0.6 K/UL (ref 0–1)
MONOCYTES NFR BLD: 5 % (ref 5–13)
NEUTS SEG # BLD: 9.2 K/UL (ref 1.8–8)
NEUTS SEG NFR BLD: 83 % (ref 32–75)
NRBC # BLD: 0 K/UL (ref 0–0.01)
NRBC BLD-RTO: 0 PER 100 WBC
PLATELET # BLD AUTO: 223 K/UL (ref 150–400)
PMV BLD AUTO: 9.6 FL (ref 8.9–12.9)
RBC # BLD AUTO: 3.7 M/UL (ref 3.8–5.2)
SPECIMEN EXP DATE BLD: NORMAL
SPECIMEN HOLD: NORMAL
WBC # BLD AUTO: 11 K/UL (ref 3.6–11)

## 2024-01-24 PROCEDURE — 2580000003 HC RX 258

## 2024-01-24 PROCEDURE — 6360000002 HC RX W HCPCS: Performed by: NURSE ANESTHETIST, CERTIFIED REGISTERED

## 2024-01-24 PROCEDURE — 7220000101 HC DELIVERY VAGINAL/SINGLE: Performed by: OBSTETRICS & GYNECOLOGY

## 2024-01-24 PROCEDURE — 36415 COLL VENOUS BLD VENIPUNCTURE: CPT

## 2024-01-24 PROCEDURE — 85025 COMPLETE CBC W/AUTO DIFF WBC: CPT

## 2024-01-24 PROCEDURE — 3700000156 HC EPIDURAL ANESTHESIA: Performed by: NURSE ANESTHETIST, CERTIFIED REGISTERED

## 2024-01-24 PROCEDURE — 86850 RBC ANTIBODY SCREEN: CPT

## 2024-01-24 PROCEDURE — 51702 INSERT TEMP BLADDER CATH: CPT

## 2024-01-24 PROCEDURE — 2500000003 HC RX 250 WO HCPCS: Performed by: NURSE ANESTHETIST, CERTIFIED REGISTERED

## 2024-01-24 PROCEDURE — 3700000025 EPIDURAL BLOCK: Performed by: ANESTHESIOLOGY

## 2024-01-24 PROCEDURE — 1100000000 HC RM PRIVATE

## 2024-01-24 PROCEDURE — 86780 TREPONEMA PALLIDUM: CPT

## 2024-01-24 PROCEDURE — 86901 BLOOD TYPING SEROLOGIC RH(D): CPT

## 2024-01-24 PROCEDURE — 6360000002 HC RX W HCPCS

## 2024-01-24 PROCEDURE — 7210000100 HC LABOR FEE PER 1 HR: Performed by: OBSTETRICS & GYNECOLOGY

## 2024-01-24 PROCEDURE — 2580000003 HC RX 258: Performed by: NURSE ANESTHETIST, CERTIFIED REGISTERED

## 2024-01-24 PROCEDURE — 86900 BLOOD TYPING SEROLOGIC ABO: CPT

## 2024-01-24 RX ORDER — NALOXONE HYDROCHLORIDE 0.4 MG/ML
INJECTION, SOLUTION INTRAMUSCULAR; INTRAVENOUS; SUBCUTANEOUS PRN
Status: DISCONTINUED | OUTPATIENT
Start: 2024-01-24 | End: 2024-01-25

## 2024-01-24 RX ORDER — SODIUM CHLORIDE 0.9 % (FLUSH) 0.9 %
5-40 SYRINGE (ML) INJECTION EVERY 12 HOURS SCHEDULED
Status: DISCONTINUED | OUTPATIENT
Start: 2024-01-24 | End: 2024-01-25

## 2024-01-24 RX ORDER — SODIUM CHLORIDE, SODIUM LACTATE, POTASSIUM CHLORIDE, AND CALCIUM CHLORIDE .6; .31; .03; .02 G/100ML; G/100ML; G/100ML; G/100ML
1000 INJECTION, SOLUTION INTRAVENOUS ONCE
Status: COMPLETED | OUTPATIENT
Start: 2024-01-24 | End: 2024-01-24

## 2024-01-24 RX ORDER — LIDOCAINE HYDROCHLORIDE AND EPINEPHRINE BITARTRATE 20; .01 MG/ML; MG/ML
INJECTION, SOLUTION SUBCUTANEOUS PRN
Status: DISCONTINUED | OUTPATIENT
Start: 2024-01-24 | End: 2024-01-24 | Stop reason: SDUPTHER

## 2024-01-24 RX ORDER — SODIUM CHLORIDE, SODIUM LACTATE, POTASSIUM CHLORIDE, AND CALCIUM CHLORIDE .6; .31; .03; .02 G/100ML; G/100ML; G/100ML; G/100ML
500 INJECTION, SOLUTION INTRAVENOUS PRN
Status: DISCONTINUED | OUTPATIENT
Start: 2024-01-24 | End: 2024-01-25

## 2024-01-24 RX ORDER — ONDANSETRON 2 MG/ML
4 INJECTION INTRAMUSCULAR; INTRAVENOUS ONCE
Status: DISCONTINUED | OUTPATIENT
Start: 2024-01-24 | End: 2024-01-26 | Stop reason: HOSPADM

## 2024-01-24 RX ORDER — SODIUM CHLORIDE, SODIUM LACTATE, POTASSIUM CHLORIDE, CALCIUM CHLORIDE 600; 310; 30; 20 MG/100ML; MG/100ML; MG/100ML; MG/100ML
INJECTION, SOLUTION INTRAVENOUS CONTINUOUS
Status: DISCONTINUED | OUTPATIENT
Start: 2024-01-24 | End: 2024-01-25

## 2024-01-24 RX ORDER — BUPIVACAINE HYDROCHLORIDE 2.5 MG/ML
INJECTION, SOLUTION EPIDURAL; INFILTRATION; INTRACAUDAL PRN
Status: DISCONTINUED | OUTPATIENT
Start: 2024-01-24 | End: 2024-01-24 | Stop reason: SDUPTHER

## 2024-01-24 RX ORDER — FENTANYL 0.2 MG/100ML-BUPIV 0.125%-NACL 0.9% EPIDURAL INJ 2/0.125 MCG/ML-%
10 SOLUTION INJECTION CONTINUOUS
Status: DISCONTINUED | OUTPATIENT
Start: 2024-01-24 | End: 2024-01-25

## 2024-01-24 RX ORDER — SODIUM CHLORIDE 0.9 % (FLUSH) 0.9 %
5-40 SYRINGE (ML) INJECTION PRN
Status: DISCONTINUED | OUTPATIENT
Start: 2024-01-24 | End: 2024-01-25

## 2024-01-24 RX ORDER — CARBOPROST TROMETHAMINE 250 UG/ML
250 INJECTION, SOLUTION INTRAMUSCULAR PRN
Status: DISCONTINUED | OUTPATIENT
Start: 2024-01-24 | End: 2024-01-25

## 2024-01-24 RX ORDER — SODIUM CHLORIDE, SODIUM LACTATE, POTASSIUM CHLORIDE, AND CALCIUM CHLORIDE .6; .31; .03; .02 G/100ML; G/100ML; G/100ML; G/100ML
1000 INJECTION, SOLUTION INTRAVENOUS PRN
Status: DISCONTINUED | OUTPATIENT
Start: 2024-01-24 | End: 2024-01-25

## 2024-01-24 RX ORDER — DOCUSATE SODIUM 100 MG/1
100 CAPSULE, LIQUID FILLED ORAL 2 TIMES DAILY
Status: DISCONTINUED | OUTPATIENT
Start: 2024-01-24 | End: 2024-01-25

## 2024-01-24 RX ORDER — ONDANSETRON 2 MG/ML
4 INJECTION INTRAMUSCULAR; INTRAVENOUS EVERY 6 HOURS PRN
Status: DISCONTINUED | OUTPATIENT
Start: 2024-01-24 | End: 2024-01-25

## 2024-01-24 RX ORDER — SODIUM CHLORIDE 9 MG/ML
25 INJECTION, SOLUTION INTRAVENOUS PRN
Status: DISCONTINUED | OUTPATIENT
Start: 2024-01-24 | End: 2024-01-25

## 2024-01-24 RX ORDER — ACETAMINOPHEN 325 MG/1
650 TABLET ORAL EVERY 4 HOURS PRN
Status: DISCONTINUED | OUTPATIENT
Start: 2024-01-24 | End: 2024-01-25

## 2024-01-24 RX ORDER — METHYLERGONOVINE MALEATE 0.2 MG/ML
200 INJECTION INTRAVENOUS PRN
Status: DISCONTINUED | OUTPATIENT
Start: 2024-01-24 | End: 2024-01-25

## 2024-01-24 RX ORDER — TRANEXAMIC ACID 10 MG/ML
1000 INJECTION, SOLUTION INTRAVENOUS
Status: DISCONTINUED | OUTPATIENT
Start: 2024-01-24 | End: 2024-01-25

## 2024-01-24 RX ORDER — MISOPROSTOL 200 UG/1
800 TABLET ORAL PRN
Status: DISCONTINUED | OUTPATIENT
Start: 2024-01-24 | End: 2024-01-25

## 2024-01-24 RX ADMIN — SODIUM CHLORIDE, POTASSIUM CHLORIDE, SODIUM LACTATE AND CALCIUM CHLORIDE 1000 ML: 600; 310; 30; 20 INJECTION, SOLUTION INTRAVENOUS at 16:18

## 2024-01-24 RX ADMIN — SODIUM CHLORIDE, POTASSIUM CHLORIDE, SODIUM LACTATE AND CALCIUM CHLORIDE 1000 ML: 600; 310; 30; 20 INJECTION, SOLUTION INTRAVENOUS at 16:47

## 2024-01-24 RX ADMIN — BUPIVACAINE HYDROCHLORIDE 10 ML: 2.5 INJECTION, SOLUTION EPIDURAL; INFILTRATION; INTRACAUDAL; PERINEURAL at 18:15

## 2024-01-24 RX ADMIN — FENTANYL CITRATE 10 ML/HR: 0.05 INJECTION, SOLUTION INTRAMUSCULAR; INTRAVENOUS at 18:43

## 2024-01-24 RX ADMIN — OXYTOCIN 999 MILLI-UNITS/MIN: 10 INJECTION, SOLUTION INTRAMUSCULAR; INTRAVENOUS at 23:50

## 2024-01-24 RX ADMIN — SODIUM CHLORIDE, POTASSIUM CHLORIDE, SODIUM LACTATE AND CALCIUM CHLORIDE: 600; 310; 30; 20 INJECTION, SOLUTION INTRAVENOUS at 18:44

## 2024-01-24 RX ADMIN — LIDOCAINE HYDROCHLORIDE,EPINEPHRINE BITARTRATE 3 ML: 20; .01 INJECTION, SOLUTION INFILTRATION; PERINEURAL at 18:09

## 2024-01-24 RX ADMIN — SODIUM CHLORIDE, POTASSIUM CHLORIDE, SODIUM LACTATE AND CALCIUM CHLORIDE 1000 ML: 600; 310; 30; 20 INJECTION, SOLUTION INTRAVENOUS at 10:09

## 2024-01-24 NOTE — PROGRESS NOTES
[address] [place] [address]   Office [phone], Fax [phone]     Antepartum Progress Note    Name: Alida Cintron MRN: [id number]  SSN: [id number]    YOB: 2000  Age: 23 y.o.  Sex: female      Subjective:      LOS: 0 days    Estimated Date of Delivery: 24   Gestational Age Today: 37w6d     Patient being observed for labor r/o. States she continues to have painful contractions.    Objective:     Vitals:  Blood pressure 128/86, pulse 77, temperature 98.2 °F (36.8 °C), temperature source Oral, resp. rate 16, height 1.575 m (5' 2\"), weight 59.4 kg (131 lb), last menstrual period 2023, SpO2 100 %.   Temp (24hrs), Av.2 °F (36.8 °C), Min:98.2 °F (36.8 °C), Max:98.2 °F (36.8 °C)    Systolic (24hrs), Av , Min:128 , Max:128      Diastolic (24hrs), Av, Min:86, Max:86           Physical Exam:  GEN: No apparent distress. Alert and oriented and responds to all questions appropriately.      LUNGS: Respirations unlabored  CARDIOVASCULAR: Regular rate.  ABDOMEN: Gravid but soft; nontender; nondistended;   NEUROLOGIC:  No focal neurologic deficits.   EXT: Well perfused. No edema.  SKIN: No obvious rashes.    Membranes:  Intact  SVE: /-2  Not currently on fetal/toco monitoring.       Labs: No results found for this or any previous visit (from the past 24 hour(s)).    Assessment and Plan:      Alida Cintron is a 23 y.o.  female at 37w6d being observed for labor r/o.    SVE unchanged from earlier. However, pt keren on the monitor. Suspect early labor.  Encourage ambulation or birthing ball use.  Recheck in 2 hours. If still unchanged, can be d/c home with strict return/labor precautions    Patient seen with Dr. Franco      Signed By: Augustina Betancourt DO    Family Medicine Resident

## 2024-01-24 NOTE — H&P
[facility] [address] [place] [address]   Office [phone], Fax [phone]      History & Physical    Name: Alida Cintron MRN: [id number]  SSN: [id number]    YOB: 2000  Age: 23 y.o.  Sex: female      Subjective:     Reason for Admission:  Pregnancy and Contractions    History of Present Illness: Ms. Gustabo Cintron is a 23 y.o.  female with an estimated gestational age of 37w6d with Estimated Date of Delivery: 24. Patient complains of contractions x 5 days that have worsened since being discharged on  for labor r/o. She also reports nausea and vomiting x 2 days. Pt states she has been unable to tolerate PO, not even water since yesterday. She complains of HA and blurry vision yesterday, resolved at this moment. She reports brown vaginal discharge after leaving the hospital on . Pregnancy has been uncomplicated. Patient denies swelling and vaginal bleeding .    OB History    Para Term  AB Living   1             SAB IAB Ectopic Molar Multiple Live Births                    # Outcome Date GA Lbr Carlos/2nd Weight Sex Delivery Anes PTL Lv   1 Current              Past Medical History:   Diagnosis Date    Anemia      No past surgical history on file.  Social History     Occupational History    Not on file   Tobacco Use    Smoking status: Never     Passive exposure: Never    Smokeless tobacco: Never   Vaping Use    Vaping Use: Never used   Substance and Sexual Activity    Alcohol use: Never    Drug use: Never    Sexual activity: Yes     Partners: Male      No family history on file.    No Known Allergies  Prior to Admission medications    Medication Sig Start Date End Date Taking? Authorizing Provider   ferrous sulfate (IRON 325) 325 (65 Fe) MG tablet Take 1 tablet by mouth daily (with breakfast) 11/15/23   Jose Ag MD   Prenatal Vit-Fe Fumarate-FA (PRENATAL VITAMIN) 27-0.8 MG TABS Take 1 tablet by mouth daily for 220 doses 23

## 2024-01-24 NOTE — PROGRESS NOTES
Provided  services remotely to ISAIAH Santizo during an assessment.    An opportunity for questions and clarifications was provided.    SURINDER Schmidt Certified   [phone]

## 2024-01-24 NOTE — PROGRESS NOTES
0945 Report received from CATRACHITO Fuller RN. Care assumed.    1318 KIERA Fuller RN performed SVE 4/90/-2. MD Joan aware. VORB for pt to ambulate around unit, spot check heart tones hourly, and recheck her cervix in a few hours.     1747 Guerrero Coates CRNA made aware of pt being ready for epidural.     1800 Guerrero Coates CRNA at bedside.    1805 Epidural Time Out     1809 Epidural Test dose    1814 Epidural procedure complete. Pt assisted to back.

## 2024-01-24 NOTE — PROGRESS NOTES
0920: Pt presents to L&D room 208 for complaint of contractions that began last Thursday but feel stronger today, occur every 3-5 minutes and are preventing her from sleep. Also reports N/V and diarrhea. Denies vaginal bleeding, LOF, HA or vision changes. Reports fetal movement is slightly less this morning. [facility]  in use.     0935: [facility] notified of pt arrival and complaints.

## 2024-01-24 NOTE — ANESTHESIA PROCEDURE NOTES
Epidural Block    Patient location during procedure: OB  Start time: 1/24/2024 6:03 PM  End time: 1/24/2024 6:20 PM  Reason for block: labor epidural  Staffing  Resident/CRNA: Guerrero Coates APRN - CRNA  Performed by: Guerrero Coates APRN - CRNA  Authorized by: Guerrero Coates APRN - CRNA    Epidural  Patient position: sitting  Prep: ChloraPrep  Patient monitoring: continuous pulse ox and frequent blood pressure checks  Approach: midline  Location: L4-5  Injection technique: CHAYA saline  Provider prep: sterile gloves and mask  Needle  Needle type: Tuohy   Needle gauge: 17 G  Needle length: 3.5 in  Needle insertion depth: 5 cm  Catheter type: end hole  Catheter size: 20 G  Catheter at skin depth: 11 cm  Test dose: negativeCatheter Secured: tegaderm and tape  Assessment  Sensory level: T6  Hemodynamics: stable  Attempts: 1  Outcomes: uncomplicated and patient tolerated procedure well  Preanesthetic Checklist  Completed: patient identified, IV checked, site marked, risks and benefits discussed, surgical/procedural consents, equipment checked, pre-op evaluation, timeout performed, anesthesia consent given, oxygen available, monitors applied/VS acknowledged, fire risk safety assessment completed and verbalized and blood product R/B/A discussed and consented

## 2024-01-25 PROCEDURE — 6370000000 HC RX 637 (ALT 250 FOR IP): Performed by: OBSTETRICS & GYNECOLOGY

## 2024-01-25 PROCEDURE — 1120000000 HC RM PRIVATE OB

## 2024-01-25 PROCEDURE — 0UQMXZZ REPAIR VULVA, EXTERNAL APPROACH: ICD-10-PCS | Performed by: OBSTETRICS & GYNECOLOGY

## 2024-01-25 PROCEDURE — 2580000003 HC RX 258

## 2024-01-25 PROCEDURE — 00HU33Z INSERTION OF INFUSION DEVICE INTO SPINAL CANAL, PERCUTANEOUS APPROACH: ICD-10-PCS | Performed by: ANESTHESIOLOGY

## 2024-01-25 PROCEDURE — 6360000002 HC RX W HCPCS

## 2024-01-25 RX ORDER — FERROUS SULFATE 325(65) MG
325 TABLET ORAL EVERY OTHER DAY
Status: DISCONTINUED | OUTPATIENT
Start: 2024-01-25 | End: 2024-01-26 | Stop reason: HOSPADM

## 2024-01-25 RX ORDER — SODIUM CHLORIDE 0.9 % (FLUSH) 0.9 %
5-40 SYRINGE (ML) INJECTION PRN
Status: DISCONTINUED | OUTPATIENT
Start: 2024-01-25 | End: 2024-01-26 | Stop reason: HOSPADM

## 2024-01-25 RX ORDER — SODIUM CHLORIDE 0.9 % (FLUSH) 0.9 %
5-40 SYRINGE (ML) INJECTION EVERY 12 HOURS SCHEDULED
Status: DISCONTINUED | OUTPATIENT
Start: 2024-01-25 | End: 2024-01-26 | Stop reason: HOSPADM

## 2024-01-25 RX ORDER — SODIUM CHLORIDE 9 MG/ML
INJECTION, SOLUTION INTRAVENOUS PRN
Status: DISCONTINUED | OUTPATIENT
Start: 2024-01-25 | End: 2024-01-26 | Stop reason: HOSPADM

## 2024-01-25 RX ORDER — IBUPROFEN 800 MG/1
800 TABLET ORAL EVERY 8 HOURS PRN
Status: DISCONTINUED | OUTPATIENT
Start: 2024-01-25 | End: 2024-01-25 | Stop reason: SDUPTHER

## 2024-01-25 RX ORDER — LANOLIN/MINERAL OIL
LOTION (ML) TOPICAL PRN
Status: DISCONTINUED | OUTPATIENT
Start: 2024-01-25 | End: 2024-01-26 | Stop reason: HOSPADM

## 2024-01-25 RX ORDER — IBUPROFEN 800 MG/1
800 TABLET ORAL EVERY 8 HOURS SCHEDULED
Status: DISCONTINUED | OUTPATIENT
Start: 2024-01-25 | End: 2024-01-26 | Stop reason: HOSPADM

## 2024-01-25 RX ORDER — ONDANSETRON 4 MG/1
8 TABLET, ORALLY DISINTEGRATING ORAL EVERY 8 HOURS PRN
Status: DISCONTINUED | OUTPATIENT
Start: 2024-01-25 | End: 2024-01-26 | Stop reason: HOSPADM

## 2024-01-25 RX ORDER — OXYCODONE HYDROCHLORIDE 5 MG/1
5 TABLET ORAL EVERY 4 HOURS PRN
Status: DISCONTINUED | OUTPATIENT
Start: 2024-01-25 | End: 2024-01-26 | Stop reason: HOSPADM

## 2024-01-25 RX ORDER — DOCUSATE SODIUM 100 MG/1
100 CAPSULE, LIQUID FILLED ORAL 2 TIMES DAILY
Status: DISCONTINUED | OUTPATIENT
Start: 2024-01-25 | End: 2024-01-26 | Stop reason: HOSPADM

## 2024-01-25 RX ORDER — OXYCODONE HYDROCHLORIDE 5 MG/1
10 TABLET ORAL EVERY 4 HOURS PRN
Status: DISCONTINUED | OUTPATIENT
Start: 2024-01-25 | End: 2024-01-26 | Stop reason: HOSPADM

## 2024-01-25 RX ORDER — OXYTOCIN 10 [USP'U]/ML
10 INJECTION, SOLUTION INTRAMUSCULAR; INTRAVENOUS ONCE
Status: DISCONTINUED | OUTPATIENT
Start: 2024-01-25 | End: 2024-01-26 | Stop reason: HOSPADM

## 2024-01-25 RX ORDER — ACETAMINOPHEN 500 MG
1000 TABLET ORAL EVERY 8 HOURS SCHEDULED
Status: DISCONTINUED | OUTPATIENT
Start: 2024-01-25 | End: 2024-01-26 | Stop reason: HOSPADM

## 2024-01-25 RX ORDER — ACETAMINOPHEN 500 MG
1000 TABLET ORAL EVERY 8 HOURS SCHEDULED
Status: DISCONTINUED | OUTPATIENT
Start: 2024-01-25 | End: 2024-01-25

## 2024-01-25 RX ADMIN — IBUPROFEN 800 MG: 800 TABLET, FILM COATED ORAL at 18:14

## 2024-01-25 RX ADMIN — IBUPROFEN 800 MG: 800 TABLET, FILM COATED ORAL at 00:48

## 2024-01-25 RX ADMIN — IBUPROFEN 800 MG: 800 TABLET, FILM COATED ORAL at 08:19

## 2024-01-25 RX ADMIN — ACETAMINOPHEN 1000 MG: 500 TABLET ORAL at 20:20

## 2024-01-25 RX ADMIN — ACETAMINOPHEN 1000 MG: 500 TABLET ORAL at 02:04

## 2024-01-25 RX ADMIN — Medication 87.3 MILLI-UNITS/MIN: at 01:21

## 2024-01-25 RX ADMIN — DOCUSATE SODIUM 100 MG: 100 CAPSULE, LIQUID FILLED ORAL at 20:20

## 2024-01-25 RX ADMIN — DOCUSATE SODIUM 100 MG: 100 CAPSULE, LIQUID FILLED ORAL at 08:19

## 2024-01-25 RX ADMIN — OXYTOCIN 87.3 MILLI-UNITS/MIN: 10 INJECTION, SOLUTION INTRAMUSCULAR; INTRAVENOUS at 01:21

## 2024-01-25 RX ADMIN — ACETAMINOPHEN 1000 MG: 500 TABLET ORAL at 14:21

## 2024-01-25 RX ADMIN — FERROUS SULFATE TAB 325 MG (65 MG ELEMENTAL FE) 325 MG: 325 (65 FE) TAB at 08:19

## 2024-01-25 ASSESSMENT — PAIN DESCRIPTION - ORIENTATION
ORIENTATION: LOWER

## 2024-01-25 ASSESSMENT — PAIN DESCRIPTION - LOCATION
LOCATION: ABDOMEN

## 2024-01-25 ASSESSMENT — PAIN DESCRIPTION - DESCRIPTORS
DESCRIPTORS: ACHING;CRAMPING
DESCRIPTORS: CRAMPING
DESCRIPTORS: CRAMPING
DESCRIPTORS: CRAMPING;SORE

## 2024-01-25 ASSESSMENT — PAIN SCALES - GENERAL
PAINLEVEL_OUTOF10: 1
PAINLEVEL_OUTOF10: 5
PAINLEVEL_OUTOF10: 8
PAINLEVEL_OUTOF10: 2
PAINLEVEL_OUTOF10: 3
PAINLEVEL_OUTOF10: 3

## 2024-01-25 ASSESSMENT — PAIN - FUNCTIONAL ASSESSMENT
PAIN_FUNCTIONAL_ASSESSMENT: ACTIVITIES ARE NOT PREVENTED

## 2024-01-25 NOTE — PROGRESS NOTES
2247: Patient actively pushing.  RN remains in continuous attendance at the bedside.  Assessment & evaluation of fetal heart rate ongoing via continuous EFM.     2348: RN and provider remained at bedside throughout pushing.  EFM continuously assessed.  Vaginal delivery of viable infant.     QBL at deliver = 200mL  2 hour QBL: 146 mL  Total QBL: 346 mL    Red rubber: 50 mL    Straight Cath: 500 mL    0310: This RN performing fundal with MIU RNCatrachito

## 2024-01-25 NOTE — L&D DELIVERY NOTE
Patient progressed well to C/C/+2 and pushed for approximately 60 min w/  over bilateral labial laceration of a liveborn male infant, Apgar scores were 8/9. Head delivered OA.  Anterior right shoulder delivered w/ single maternal effort w/ posterior shoulder and body following easily. Infant placed on maternal abdomen. Delayed cord clamping x 1 min. Cord clamped x 2 and cut by FOB. Pitocin infusion initiated. Placenta delivered spontaneously intact w/ 3 v cord. Perineum inspected. Bilateral labial lacerations & vaginal laceration repaired with 3.0 monocry. Fundus firm at U. Mother and baby bonding in LDR. Delivery  cc.    Gustabo CintronAd [[id number]]      Labor Events     Steroids: None  Cervical Ripening Date/Time:      Antibiotics Received during Labor: No  Rupture Date/Time:      Rupture Type: SROM  Fluid Color: Clear, Bloody Show  Meconium Consistency: Moderate  Fluid Odor: None  Fluid Volume: Scant  Induction: None  Augmentation: None  Labor Complications: Meconium at birth              Anesthesia    Method: Epidural       Delivery Details      Delivery Date: 24 Delivery Time: 23:42:00   Delivery Type: Vaginal, Spontaneous              [place] Presentation    Presentation: Vertex  Position: Left  _: Occiput  _: Anterior       Shoulder Dystocia    Shoulder Dystocia Present?: No       Assisted Delivery Details    Forceps Attempted?: No  Vacuum Extractor Attempted?: No                           Cord    Vessels: 3 Vessels  Complications: None  Delayed Cord Clamping?: Yes  Cord Clamped Date/Time: 2024 23:44:29  Cord Blood Disposition: Lab  Gases Sent?: No              Placenta    Date/Time: 2024 23:48:00  Removal: Expressed  Appearance: Intact  Disposition: Discarded       Lacerations    Episiotomy: None  Perineal Lacerations: None  Other Lacerations: labial laceration  Labial Laceration: bilateral Repaired?: Yes   Number of Repair Packets: 2       Vaginal Counts    Initial  Count Personnel: CATRACHITO BALLARD RN  Initial Count Verified By: KIMBERLYN NEGRON  Intial Sponge Count: Correct Intial Needles Count: Correct Intial Instruments Count: Correct   Final Count Personnel: MD URSULA  Final Count Verified By: KIMBERLY SANCHEZ RN       Blood Loss  Mother: Alida Sorto #[id number]     Start of Mother's Information      Delivery Blood Loss  24 1142 - 24 0018      None                 End of Mother's Information  Mother: Alida Sorto #[id number]                Delivery Providers    Delivering clinician: Bony Carreon MD     Provider Role    Bony Carreon MD Obstetrician    Tamera Sanchez RN Primary Nurse    Louis Solano RN Primary  Nurse    Mikayla Schwartz RN Staff Nurse    Rounds, Melissa CORDERO DO Resident              [place] Assessment    Living Status: Living  Delivery Location Comment: 208        Skin Color:   Heart Rate:   Reflex Irritability:   Muscle Tone:   Respiratory Effort:   Total:            1 Minute:    0    2    2    2    2    8         5 Minute:    1    2    2    2    2    9                                        Apgars Assigned By: ANALISA SOLANO RN              Resuscitation    Method: Bulb Suction, Stimulation             [place] Measurements               Skin to Skin      Skin to Skin Initiation Date/Time: 24 23:44:00 EST     Skin to Skin With: Mother

## 2024-01-25 NOTE — ANESTHESIA PRE PROCEDURE
Department of Anesthesiology  Preprocedure Note       Name:  Alida Cintron   Age:  23 y.o.  :  2000                                          MRN:  [id number]         Date:  2024      Surgeon: * No surgeons listed *    Procedure: * No procedures listed *    Medications prior to admission:   Prior to Admission medications    Medication Sig Start Date End Date Taking? Authorizing Provider   ferrous sulfate (IRON 325) 325 (65 Fe) MG tablet Take 1 tablet by mouth daily (with breakfast) 11/15/23   Jose Ag MD   Prenatal Vit-Fe Fumarate-FA (PRENATAL VITAMIN) 27-0.8 MG TABS Take 1 tablet by mouth daily for 220 doses 23  Jose Ag MD       Current medications:    Current Facility-Administered Medications   Medication Dose Route Frequency Provider Last Rate Last Admin   • ondansetron (ZOFRAN) injection 4 mg  4 mg IntraVENous Once Augustina Betancourt, DO       • lactated ringers IV soln infusion   IntraVENous Continuous Augustina Betancourt  mL/hr at 24 1844 New Bag at 24 1844   • lactated ringers bolus bolus 500 mL  500 mL IntraVENous PRN Augustina Betancourt .8 mL/hr at 24 1618 1,000 mL at 24 1618    Or   • lactated ringers bolus bolus 1,000 mL  1,000 mL IntraVENous PRN Augustina Betancourt  mL/hr at 24 1647 1,000 mL at 24 1647   • sodium chloride flush 0.9 % injection 5-40 mL  5-40 mL IntraVENous 2 times per day Augustina Betancourt DO       • sodium chloride flush 0.9 % injection 5-40 mL  5-40 mL IntraVENous PRN Augustina Betancourt DO       • 0.9 % sodium chloride infusion  25 mL IntraVENous PRN Augustina Betancourt DO       • ondansetron (ZOFRAN) injection 4 mg  4 mg IntraVENous Q6H PRN Augustina Betancourt, DO       • oxytocin (PITOCIN) 30 units in 500 mL infusion  87.3 halley-units/min IntraVENous Continuous PRN Augustina Betancourt DO        And   • oxytocin (PITOCIN) 10 unit bolus from the bag                                Cardiovascular:Negative CV ROS            Rhythm: regular  Rate: normal                    Neuro/Psych:   Negative Neuro/Psych ROS              GI/Hepatic/Renal: Neg GI/Hepatic/Renal ROS            Endo/Other: Negative Endo/Other ROS                    Abdominal: normal exam            Vascular: negative vascular ROS.         Other Findings:       Anesthesia Plan      epidural and regional     ASA 2             Anesthetic plan and risks discussed with patient.    Use of blood products discussed with patient whom.    Plan discussed with CRNA.          Post-op pain plan if not by surgeon: continuous epidural        MARCELA Alcaraz - CRNA   1/24/2024

## 2024-01-25 NOTE — PROGRESS NOTES
[address] [address] [place] [id number]   Office [phone], Fax [phone]                                Post-Partum Day Number 1 Progress Note    Patient doing well post-partum without significant complaint.  Pain well controlled.  Lochia normal.  Tolerating normal diet.  Ambulating.  Voiding without difficulty.      Vitals:  Patient Vitals for the past 8 hrs:   BP Temp Temp src Pulse Resp SpO2   24 0748 100/76 98.2 °F (36.8 °C) Oral 77 16 99 %     Temp (24hrs), Av.3 °F (36.8 °C), Min:98.1 °F (36.7 °C), Max:98.5 °F (36.9 °C)      Exam:  Patient without distress.               CTAB, no w/r/r/c.               RRR, +S1 and S2, no m/r/g.    Abdomen soft, fundus firm at level of umbilicus, nontender.               Perineum with normal lochia noted.               Lower extremities:  Trace edema. No palpable cords or tenderness.    Lab/Data Review:  No results found for this or any previous visit (from the past 12 hour(s)).      Assessment and Plan:    Alida Cintron is a 23 y.o.  s/p  at 37w6d. Pregnancy was uncomplicated. Patient appears to be having uncomplicated post-partum course.      Continue routine perineal care and maternal education.    Plan discharge tomorrow if no problems occur.    Patient discussed with Dr. Franco.                 Augustina Betancourt DO  Family Medicine Resident

## 2024-01-25 NOTE — PROGRESS NOTES
Labor Progress Note    Patient seen, fetal heart rate and contraction pattern evaluated, patient examined. Resting comfortably in no pain, feeling no pressure.    Patient Vitals for the past 4 hrs:   Temp Pulse Resp BP SpO2   24 -- 85 -- 101/60 --   24 -- 96 -- -- 100 %   24 98.2 °F (36.8 °C) 83 16 102/62 99 %   24 98.1 °F (36.7 °C) 82 -- 110/70 --   24 -- 78 -- 106/67 --   24 -- -- -- -- 100 %   24 -- 75 -- 113/69 --   24 -- 74 -- -- 100 %   24 -- 84 -- 111/63 --   24 -- 82 -- 113/68 100 %   24 -- 84 -- 114/68 --          Physical Exam:  Cervical Exam:  no repeat cervical check  Membranes:  SROM, clear bloody fluid  Fetal Heart Rate: Reactive  Baseline: 120 per minute  Variability: moderate  Accelerations: yes  Decelerations: none  Uterine contractions: regular, every 3-4 minutes    Assessment/Plan     Alida Cintron is a 23 y.o.  female currently in active labor.    Active labor: Patient 6/100/0 at 1700. S/p epidural placement at 1800. Doing well, not in pain. SROM at 1645 with clear, bloody fluid. Anticipate . GBS negative.   - Next check: ~2300    Fetal wellbeing: Cat 1 tracing    Birth Plan: anticipate   - Feeding: breast and bottle  - Pediatrician: [facility]  - Circ: tbd    Pt will be discussed with Dr. Velma MD.    Vikram Durán MD  Family Practice Resident

## 2024-01-26 ENCOUNTER — TELEPHONE (OUTPATIENT)
Age: 24
End: 2024-01-26

## 2024-01-26 VITALS
OXYGEN SATURATION: 99 % | HEIGHT: 62 IN | WEIGHT: 131 LBS | DIASTOLIC BLOOD PRESSURE: 73 MMHG | RESPIRATION RATE: 16 BRPM | BODY MASS INDEX: 24.11 KG/M2 | SYSTOLIC BLOOD PRESSURE: 112 MMHG | TEMPERATURE: 98.2 F | HEART RATE: 74 BPM

## 2024-01-26 LAB — T PALLIDUM AB SER QL IA: NON REACTIVE

## 2024-01-26 PROCEDURE — 99238 HOSP IP/OBS DSCHRG MGMT 30/<: CPT | Performed by: OBSTETRICS & GYNECOLOGY

## 2024-01-26 PROCEDURE — 6370000000 HC RX 637 (ALT 250 FOR IP): Performed by: OBSTETRICS & GYNECOLOGY

## 2024-01-26 RX ORDER — IBUPROFEN 800 MG/1
800 TABLET ORAL EVERY 8 HOURS SCHEDULED
Qty: 30 TABLET | Refills: 0 | Status: SHIPPED | OUTPATIENT
Start: 2024-01-26

## 2024-01-26 RX ORDER — PSEUDOEPHEDRINE HCL 30 MG
100 TABLET ORAL 2 TIMES DAILY
Qty: 20 CAPSULE | Refills: 0 | Status: SHIPPED | OUTPATIENT
Start: 2024-01-26

## 2024-01-26 RX ADMIN — DOCUSATE SODIUM 100 MG: 100 CAPSULE, LIQUID FILLED ORAL at 09:27

## 2024-01-26 RX ADMIN — IBUPROFEN 800 MG: 800 TABLET, FILM COATED ORAL at 06:07

## 2024-01-26 RX ADMIN — ACETAMINOPHEN 1000 MG: 500 TABLET ORAL at 09:27

## 2024-01-26 ASSESSMENT — PAIN DESCRIPTION - ORIENTATION: ORIENTATION: LOWER

## 2024-01-26 ASSESSMENT — PAIN DESCRIPTION - LOCATION
LOCATION: ABDOMEN
LOCATION: ABDOMEN

## 2024-01-26 ASSESSMENT — PAIN SCALES - GENERAL
PAINLEVEL_OUTOF10: 5
PAINLEVEL_OUTOF10: 2

## 2024-01-26 ASSESSMENT — PAIN DESCRIPTION - DESCRIPTORS: DESCRIPTORS: CRAMPING

## 2024-01-26 NOTE — DISCHARGE INSTRUCTIONS
After Your Delivery (the Postpartum Period): Care Instructions  Overview     Congratulations on the birth of your baby. Like pregnancy, the  period can be a time of excitement, nirali, and exhaustion. You may look at your wondrous little baby and feel happy. You may also be overwhelmed by your new sleep hours and new responsibilities.  At first, babies often sleep during the days and are awake at night. They do not have a pattern or routine. They may make sudden gasps, jerk themselves awake, or look like they have crossed eyes. These are all normal, and they may even make you smile.  In these first weeks after delivery, try to take good care of yourself. It may take 4 to 6 weeks to feel like yourself again, and possibly longer if you had a  birth. You will likely feel very tired for several weeks. Your days will be full of ups and downs, but lots of nirali as well.  Follow-up care is a key part of your treatment and safety. Be sure to make and go to all appointments, and call your doctor if you are having problems. It's also a good idea to know your test results and keep a list of the medicines you take.  How can you care for yourself at home?  Take care of your body after delivery  Use pads instead of tampons for the bloody flow that may last as long as 2 weeks.  Ease cramps with ibuprofen (Advil, Motrin).  Ease soreness of hemorrhoids and the area between your vagina and rectum with ice compresses or witch hazel pads.  Ease constipation by drinking lots of fluid and eating high-fiber foods. Ask your doctor about over-the-counter stool softeners.  Cleanse yourself with a gentle squeeze of warm water from a bottle instead of wiping with toilet paper.  Take a sitz bath in warm water several times a day.  Wear a good nursing bra. Ease sore and swollen breasts with warm, wet washcloths.  If you aren't breastfeeding, use ice rather than heat for breast soreness.  Your period may not start for several  instrucciones, siempre pregunte a waters profesional de junito. [facility] niega toda garantía o responsabilidad por waters uso de esta información.

## 2024-01-26 NOTE — TELEPHONE ENCOUNTER
Good Morning ,   I called the patient today and told her that we received her Nexplanon implant in clinic. Patient states that she was told by a doctor at the hospital she needs to wait 6 weeks before getting it inserted. Does she need to wait 6 weeks? I wasn't sure what the time frame is for the Nexplanon insertions after delivering. Can you please advise?  Thank you

## 2024-01-26 NOTE — DISCHARGE SUMMARY
[address] [place] [address]   Office [phone], Fax [phone]    Obstetrical Discharge Summary     Name: Alida Cintron MRN: [id number]  SSN: [id number]    YOB: 2000  Age: 23 y.o.  Sex: female      Admit Date: 2024    Discharge Date: 2024     Admitting Physician: Ellen Franco DO     Attending Physician:  Ellen Franco DO     Admission Diagnoses: 37 weeks gestation of pregnancy [Z3A.37]    Discharge Diagnoses:   Information for the patient's :  Ad Sorto [[id number]]   @[id number]@      Additional Diagnoses:  No components found for: \"OBEXTABORH\", \"OBEXTABSCRN\", \"OBEXTRUBELLA\", \"OBEXTGRBS\"    Immunization(s):   Immunization History   Administered Date(s) Administered    Influenza, FLUARIX, FLULAVAL, FLUZONE (age 6 mo+) AND AFLURIA, (age 3 y+), PF, 0.5mL 2023    TDaP, ADACEL (age 10y-64y), BOOSTRIX (age 10y+), IM, 0.5mL 2023        Hospital Course:   Patient is a 23 y.o.  s/p  at 37 weeks 6 days.  Presented for  Active Labor.  Pregnancy uncomplicated. Labor was uncomplicated.  Delivered TLMI by  without complications.  Normal hospital course following the delivery.  On day of discharge patient reported minimal lochia, well controlled pain, and no other complaints.  Discharged with pain regimen and bowel regimen.  Advised to continue prenatal vitamins.  Follow up with Dr. Ag in 5 weeks.      Depression Scale  Postpartum Depression: Low Risk  (2024)    [place]  Depression Scale     Last EPDS Total Score: 1     Last EPDS Self Harm Result: Never        Follow up test at discharge:none  Condition at Discharge:  stable  Disposition: Discharge to Home    Physical exam:  /73   Pulse 74   Temp 98.2 °F (36.8 °C) (Oral)   Resp 16   Ht 1.575 m (5' 2\")   Wt 59.4 kg (131 lb)   LMP 2023   SpO2 99%   Breastfeeding Unknown   BMI 23.96 kg/m²     Exam:  Patient without distress.                CTAB, no w/r/r/c               RRR, + S1 and S2, no m/r/g               Abdomen soft, fundus firm at level of umbilicus, non tender               Perineum with normal lochia noted.               Lower extremities are negative for swelling, cords or tenderness.      Patient Instructions:      Medication List        START taking these medications      docusate 100 MG Caps  Commonly known as: COLACE, DULCOLAX  Take 100 mg by mouth 2 times daily     ibuprofen 800 MG tablet  Commonly known as: ADVIL;MOTRIN  Take 1 tablet by mouth every 8 hours            CONTINUE taking these medications      ferrous sulfate 325 (65 Fe) MG tablet  Commonly known as: IRON 325  Take 1 tablet by mouth daily (with breakfast)     Prenatal Vitamin 27-0.8 MG Tabs  Take 1 tablet by mouth daily for 220 doses               Where to Get Your Medications        These medications were sent to [id number] - [place], [place] - [address] - P [phone] - F [phone] [address] [address]      Phone: [phone]   docusate 100 MG Caps  ibuprofen 800 MG tablet          Reference the discharge instructions.    Follow-up Information       Follow up With Specialties Details Why Contact Info    Jose Ag MD Family Medicine Go on 2/28/2024 Your appointment is at 11:00am. [address] [facility] [address] [phone]                 Signed By:  Augustina Betancourt DO    Family Medicine Resident

## 2024-01-26 NOTE — LACTATION NOTE
This note was copied from a baby's chart.  Pt will successfully establish breastfeeding by feeding in response to early feeding cues   or wake every 3h, will obtain deep latch, and will keep log of feedings/output.  Taught to BF at hunger cues and or q 2-3 hrs and to offer 10-20 drops of hand expressed colostrum at any non-feeds.      Left Breast: Soft  Left Nipple: Protrude  Right Nipple: Protrude  Right Breast: Soft           Infant Supplementation: Formula   Formula Type: Similac 360 Total Care     Not seen at breast.      Breast Care: Encouraged to wear bra, Nursing pads, Lanolin provided, Pumping supply provided     Lactation Comment: Mom states baby nursing well.  Giving both breast and formula.  Discussed importance of colostrum.     Breast Feeding Discharge Information discussed:    Chart shows numerous feedings, void, stool WNL.  Discussed Importance of monitoring outputs and feedings on first week of  Breastfeeding.  Discussed ways to tell if baby getting enough, ie  Voids and stools, by day 7, baby should have at least  4-6 wet diapers a day, change in color of stool to a seedy yellow, and return to birth wt within 2 weeks with a steady increase after that..  Follow up with pediatrician visit for weight check in 1-2 days reviewed.      Discussed Breast feeding support groups and encouraged to call Warm line number, [phone]  for any breast feeding questions or problems that arise.  Please leave a message and tell us what is going on.  We will return your call within 24 hours.  Please repeat your phone number.      Feedings  Encouraged mom to attempt feeding with baby led feeding cues. Just as sucking on fingers, rooting, mouthing.   Looking for 8-12 feedings in 24 hours.   Don't limit baby at breast, allow baby to come off breast on it's own. Baby may want to feed  often and may increase number of feedings on second day of life. Skin to skin encouraged.  In 4-6 weeks, baby may go though a growth spurt and

## 2024-02-24 NOTE — ANESTHESIA POSTPROCEDURE EVALUATION
Department of Anesthesiology  Postprocedure Note    Patient: Alida Cintron  MRN: [id number]  YOB: 2000  Date of evaluation: 2/24/2024    Procedure Summary       Date: 01/24/24 Room / Location:     Anesthesia Start: 1803 Anesthesia Stop: 2342    Procedure: Labor Analgesia Diagnosis:     Scheduled Providers:  Responsible Provider: Jarrell Doshi MD    Anesthesia Type: epidural, regional ASA Status: 2            Anesthesia Type: No value filed.    Sonya Phase I:      Sonya Phase II:      Anesthesia Post Evaluation    Patient location during evaluation: PACU  Patient participation: complete - patient participated  Level of consciousness: awake and alert  Pain score: 0  Airway patency: patent  Nausea & Vomiting: no vomiting and no nausea  Cardiovascular status: hemodynamically stable  Respiratory status: room air  Hydration status: stable  Multimodal analgesia pain management approach    No notable events documented.

## 2024-02-26 ENCOUNTER — OFFICE VISIT (OUTPATIENT)
Age: 24
End: 2024-02-26

## 2024-02-26 DIAGNOSIS — Z13.9 ENCOUNTER FOR SCREENING INVOLVING SOCIAL DETERMINANTS OF HEALTH (SDOH): Primary | ICD-10-CM

## 2024-02-26 NOTE — PROGRESS NOTES
DICKSON Navigator Assessment - Postpartum    Mood? happy  Any challenge since having baby? None  Are family members adjusting to the baby? Yes  Are you getting the support you need from your family/partner? Yes  Do you have everything you need to take care of baby? Yes  Worried about having enough money to buy food?  no  Do you have health insurance?  Yes  Current child-care arrangements: in home: primary caregiver is mother  Parental coping and self-care: doing well   Secondhand smoke exposure (regular or electronic cigarettes): no   Domestic violence in the home: no       insured? no  Breast and/or formula feeding? Both, breastfeeding and formula  Safe sleeping? Sleeping in crib  WIC enrollment? Yes  SNAP enrollment?  No   Any concerns? No    PHQ2 Score: 0  EPDS Score:    Postpartum medical visit scheduled? Upcoming visit on 3/1/23 with Dr. Drew  Do you have a pediatrician for baby? yes  Has the baby had a check-up with the medical provider? Yes, seen at [facility]    Patient reports no longer having any financial strain, no food insecurity. Receiving WIC services. Advised of transportation assistance via Medicaid managed care plan.    Plan:  1) [place] Medicaid enrollment  2) SNAP enrollment in 4 weeks    SANG Hollandator

## 2024-02-27 ENCOUNTER — OFFICE VISIT (OUTPATIENT)
Age: 24
End: 2024-02-27

## 2024-02-27 DIAGNOSIS — Z59.7 INSUFFICIENT SOCIAL INSURANCE AND WELFARE SUPPORT: Primary | ICD-10-CM

## 2024-02-27 SDOH — ECONOMIC STABILITY - INCOME SECURITY: INSUFFICIENT SOCIAL INSURANCE AND WELFARE SUPPORT: Z59.7

## 2024-02-27 NOTE — PROGRESS NOTES
Medicaid enrollment visit with DICKSON Navigator via telephone. DICKSON assisted patient with Medicaid enrollment process for .  [place] completed with Cover [employer].     Patient advised she should hear back from LDSS within 30 days.      SW to follow-up with patient in 30 days to check status.     SANG Holland Navigator

## 2024-03-01 ENCOUNTER — PROCEDURE VISIT (OUTPATIENT)
Age: 24
End: 2024-03-01
Payer: COMMERCIAL

## 2024-03-01 VITALS
HEIGHT: 62 IN | HEART RATE: 79 BPM | OXYGEN SATURATION: 99 % | DIASTOLIC BLOOD PRESSURE: 70 MMHG | BODY MASS INDEX: 20.43 KG/M2 | TEMPERATURE: 98.1 F | SYSTOLIC BLOOD PRESSURE: 104 MMHG | RESPIRATION RATE: 18 BRPM | WEIGHT: 111 LBS

## 2024-03-01 DIAGNOSIS — Z30.017 NEXPLANON INSERTION: Primary | ICD-10-CM

## 2024-03-01 DIAGNOSIS — N94.6 DYSMENORRHEA: ICD-10-CM

## 2024-03-01 LAB
HCG, PREGNANCY, URINE, POC: NEGATIVE
VALID INTERNAL CONTROL, POC: YES

## 2024-03-01 PROCEDURE — 11981 INSERTION DRUG DLVR IMPLANT: CPT

## 2024-03-01 PROCEDURE — 81025 URINE PREGNANCY TEST: CPT | Performed by: FAMILY MEDICINE

## 2024-03-01 PROCEDURE — PBSHW AMB POC URINE PREGNANCY TEST, VISUAL COLOR COMPARISON

## 2024-03-01 PROCEDURE — 99999 PR OFFICE/OUTPT VISIT,PROCEDURE ONLY: CPT | Performed by: FAMILY MEDICINE

## 2024-03-01 PROCEDURE — PBSHW PBB SHADOW CHARGE

## 2024-03-01 RX ADMIN — ETONOGESTREL 68 MG: 68 IMPLANT SUBCUTANEOUS at 09:57

## 2024-03-01 NOTE — PROGRESS NOTES
Alida Cintron is a 23 y.o. female      Chief Complaint   Patient presents with    Procedure     Patient is coming in for a Nexplanon insertion. Patient has not had a period after her delivery. Last sexual intercourse was may last year. She is breastfeeding. No other birth control. No other concerns.        \"Have you been to the ER, urgent care clinic since your last visit?  Hospitalized since your last visit?\"    NO    “Have you seen or consulted any other health care providers outside of [facility] since your last visit?”    NO              Vitals:    03/01/24 0917   BP: 104/70   Site: Right Upper Arm   Position: Sitting   Pulse: 79   Resp: 18   Temp: 98.1 °F (36.7 °C)   TempSrc: Oral   SpO2: 99%   Weight: 50.3 kg (111 lb)   Height: 1.575 m (5' 2\")            Health Maintenance Due   Topic Date Due    Hepatitis B vaccine (1 of 3 - 3-dose series) Never done    COVID-19 Vaccine (1) Never done    Varicella vaccine (1 of 2 - 2-dose childhood series) Never done    HPV vaccine (1 - 2-dose series) Never done         Medication Reconciliation completed, changes noted.  Please  Update medication list.

## 2024-03-01 NOTE — PROGRESS NOTES
[facility]  OFFICE PROCEDURE PROGRESS NOTE        Chart reviewed for the following:   Vikram RASHID MD, have reviewed the History, Physical and updated the Allergic reactions for Alida Pop Adqui     TIME OUT performed immediately prior to start of procedure:   Vikram RASHID MD, have performed the following reviews on Alida Pop Adqui prior to the start of the procedure:            * Patient was identified by name and date of birth   * Agreement on procedure being performed was verified  * Risks and Benefits explained to the patient  * Procedure site verified and marked as necessary  * Patient was positioned for comfort  * Consent was signed and verified     Time: 9:31 AM    Date of procedure: 3/1/2024    Procedure performed by:  Almaz Drew DO    Provider assisted by: Chey Zapata MA    Patient assisted by: self    How tolerated by patient: tolerated the procedure well with no complications    Post Procedural Pain Scale: 0 - No Hurt    Comments: none      The patient's left arm was selected and the proposed site marked with a sterile marking pen.  The site was cleaned with chloraprep x3.  The site was injected with 3mL 1% lidocaine from insertion to the full extent of the implant.  The insertion device was then used to elevated the skin and tunnel under the skin the full length of the implant.  The device was then deployed and withdrawn leaving the implant in place.  The implant was palpated to ensure proper placement.  The insertion device was inspected to insure that the entire device was deployed.     Guaze was placed over the incision and a pressure wrap was placed around the arm to reduce swelling overnight    The patient was given her information card and reminded that the device should be removed no later than three years and that contrceptive effectiveness was not guaranteed after that date.  The patient expressed understanding.

## 2024-03-01 NOTE — PROGRESS NOTES
I discussed the patient's history and physical exam with the resident. I reviewed the assessment and plan and agree with the resident's documentation.     Chief Complaint   Patient presents with    Procedure     Patient is coming in for a Nexplanon insertion. Patient has not had a period after her delivery. Last sexual intercourse was may last year. She is breastfeeding. No other birth control. No other concerns.

## 2024-03-08 ENCOUNTER — OFFICE VISIT (OUTPATIENT)
Age: 24
End: 2024-03-08

## 2024-03-08 VITALS
SYSTOLIC BLOOD PRESSURE: 121 MMHG | BODY MASS INDEX: 20.06 KG/M2 | HEART RATE: 82 BPM | OXYGEN SATURATION: 99 % | WEIGHT: 109 LBS | DIASTOLIC BLOOD PRESSURE: 81 MMHG | RESPIRATION RATE: 18 BRPM | TEMPERATURE: 98.1 F | HEIGHT: 62 IN

## 2024-03-08 ASSESSMENT — PATIENT HEALTH QUESTIONNAIRE - PHQ9
SUM OF ALL RESPONSES TO PHQ QUESTIONS 1-9: 0
SUM OF ALL RESPONSES TO PHQ9 QUESTIONS 1 & 2: 0
SUM OF ALL RESPONSES TO PHQ QUESTIONS 1-9: 0
SUM OF ALL RESPONSES TO PHQ QUESTIONS 1-9: 0
2. FEELING DOWN, DEPRESSED OR HOPELESS: 0
SUM OF ALL RESPONSES TO PHQ QUESTIONS 1-9: 0
1. LITTLE INTEREST OR PLEASURE IN DOING THINGS: 0

## 2024-03-08 NOTE — PROGRESS NOTES
[facility] Medicine Office Visit     Assessment/ Plan: Alida Cintron is a 23 y.o. female presenting for:    Postpartum Visit - Doing well.   -- plan for family planning: Nexplanon placed last week   -- EPDS 1    20 minutes were spent on the day of this encounter both with the patient and in related activities including chart review, care coordination and counseling.     Patient instructions were discussed and/or provided in the AVS. The patient understands and agrees to the plan.    RETURN TO CARE: prn   Future Appointments   Date Time Provider Department Center   3/26/2024  2:00 PM Dee Dee Soto BS AMB       Subjective:  Chief Complaint   Patient presents with    Postpartum Care     Patient is coming in for a postpartum follow up. Vaginal delivery on 2023. No other concerns.      HPI: Alida is a 23 y.o.  who is about 6-weeks postpartum from a . Her pregnancy was complicated by alpha thal silent carrier, anemia. Labor and delivery uncomplicated.    Lochia: still a little bit, light brown   Pain: no trouble with urination, stools   Baby: doing well   Sexual activity: not recently   Family planning: Nexplanon in place   Mood: doing well, non concerns   Feeding: breastfeeding  Support from FOB/family: good     I have reviewed the patients problem list, current medications, allergies, family, medical and social history. I have updated them as needed.     Review of Systems  See HPI.    Objective:  /81 (Site: Right Upper Arm, Position: Sitting)   Pulse 82   Temp 98.1 °F (36.7 °C) (Oral)   Resp 18   Ht 1.575 m (5' 2\")   Wt 49.4 kg (109 lb)   LMP 2023   SpO2 99%   BMI 19.94 kg/m²   Physical Exam  Vitals and nursing note reviewed.   Constitutional:       General: She is not in acute distress.     Appearance: Normal appearance.   HENT:      Head: Normocephalic and atraumatic.   Eyes:      Extraocular Movements: Extraocular movements intact.

## 2024-03-08 NOTE — PROGRESS NOTES
Alida Cintron is a 23 y.o. female      Chief Complaint   Patient presents with    Postpartum Care     Patient is coming in for a postpartum follow up. Vaginal delivery on 01/24/2023. No other concerns.        \"Have you been to the ER, urgent care clinic since your last visit?  Hospitalized since your last visit?\"    NO    “Have you seen or consulted any other health care providers outside of [facility] since your last visit?”    NO              Vitals:    03/08/24 0951   BP: 121/81   Site: Right Upper Arm   Position: Sitting   Pulse: 82   Resp: 18   Temp: 98.1 °F (36.7 °C)   TempSrc: Oral   SpO2: 99%   Weight: 49.4 kg (109 lb)   Height: 1.575 m (5' 2\")            Health Maintenance Due   Topic Date Due    Hepatitis B vaccine (1 of 3 - 3-dose series) Never done    COVID-19 Vaccine (1) Never done    Varicella vaccine (1 of 2 - 2-dose childhood series) Never done    HPV vaccine (1 - 2-dose series) Never done         Medication Reconciliation completed, changes noted.  Please  Update medication list.

## 2024-03-09 PROBLEM — Z3A.37 37 WEEKS GESTATION OF PREGNANCY: Status: RESOLVED | Noted: 2024-01-24 | Resolved: 2024-03-09

## 2024-03-09 PROBLEM — Z34.90 PREGNANCY: Status: RESOLVED | Noted: 2024-01-17 | Resolved: 2024-03-09

## 2024-03-09 RX ORDER — ETONOGESTREL 68 MG/1
68 IMPLANT SUBCUTANEOUS ONCE
COMMUNITY
Start: 2024-01-19

## 2024-03-26 ENCOUNTER — OFFICE VISIT (OUTPATIENT)
Age: 24
End: 2024-03-26

## 2024-03-26 DIAGNOSIS — Z78.9 NEED FOR FOLLOW-UP BY SOCIAL WORKER: Primary | ICD-10-CM

## 2024-03-26 NOTE — PROGRESS NOTES
SNAP enrollment visit with DICKSON Navigator via telephone. SW assisted patient with SNAP enrollment process via [email or web address]. Application completed today, more #[id number]. Applied for SNAP for . ID uploaded to application. DICKSON Navigator listed as authorized rep per patient's request/consent.      Patient advised she should hear back from LDSS within 30 days. Advised to respond to any request for additional documentation promptly and by due date to avoid denial of application.      SW to follow-up with patient in 30 days to check status.     SANG Holland Navigator

## 2024-04-08 ENCOUNTER — OFFICE VISIT (OUTPATIENT)
Age: 24
End: 2024-04-08

## 2024-04-08 DIAGNOSIS — Z78.9 NEED FOR FOLLOW-UP BY SOCIAL WORKER: Primary | ICD-10-CM

## 2024-04-08 NOTE — PROGRESS NOTES
Patient seen by SW Navigator for assistance with recently approved SNAP benefits.    Patient reports received SNAP EBT card; however, unable to use card as system will not allow her to activate card. Patient states does not have SSN so unable to complete activation process. DICKSON advised patient that DSS usually uses a combination of  digits for SSN. DICKSON assisted patient by contacting [employer] E[employer] customer service [phone]. SW was able to active card on behalf of patient ([id number] utilized as PIN).     Patient advised that card is ready to be used. Provided patient with balance amount. Patient understands that EBT card can only be utilized for food items.     No other needs at present time.    SANG Holland Navigator